# Patient Record
Sex: FEMALE | Race: WHITE | NOT HISPANIC OR LATINO | Employment: FULL TIME | URBAN - METROPOLITAN AREA
[De-identification: names, ages, dates, MRNs, and addresses within clinical notes are randomized per-mention and may not be internally consistent; named-entity substitution may affect disease eponyms.]

---
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
## 2019-12-26 NOTE — TELEPHONE ENCOUNTER
RECORDS RECEIVED FROM: Self   Date of Appt: 2/10/20   NOTES (FOR ALL VISITS) STATUS DETAILS   OFFICE NOTE from referring provider N/A    OFFICE NOTE from other specialist Care Everywhere Selin Macile @ [facility] Derm:  10/31/19    Karissa Stark NP @ [facility] (PCP):  10/15/19    Dr. Dillon @ [facility] Cardiology:  7/9/19   DISCHARGE SUMMARY from hospital N/A    DISCHARGE REPORT from the ER N/A    OPERATIVE REPORT N/A    MEDICATION LIST Care Everywhere    IMAGING  (FOR ALL VISITS)     EMG N/A    EEG N/A    MRI (HEAD, NECK, SPINE) N/A    LUMBAR PUNCTURE N/A    PEARL Scan N/A    CT (HEAD, NECK, SPINE) N/A

## 2020-02-10 ENCOUNTER — PRE VISIT (OUTPATIENT)
Dept: NEUROLOGY | Facility: CLINIC | Age: 20
End: 2020-02-10

## 2020-03-08 NOTE — PROGRESS NOTES
[facility] Neurology Consultation    Cesia Johnson MRN# [id number]   Age: 19 year old YOB: 2000     Requesting physician: Selin Young, Guy Alvarado     Reason for Consultation: paresthesias of hands and feet    History of Presenting Symptoms:  Cesia Johnson is a 19 year old female who presents today for evaluation of paresthesias of hands and feet.    The patient was seen by her PCP on 1/21/2020 for ongoing concerns of paresthesias in hands and feet, tachycardia, weight gain, myalgias, chronic fatigue.  She is followed by psychiatry for anxiety, depression, ADHD, and her self cutting and is on gabapentin for her psychiatric symptoms.  She had a lyme's screen done during this visit which returned negative.        Seen by cardiology on 1/17/2020 for several episodes of light-headedness with near syncope and profuse sweating.  Echocardiogram was reviewed, and impressions were that of palpitations and tachycardia, congential heart disease with pulmonary regurgitation, and presyncope thought to be related to autonomic dysfunction or heat intolerance in the setting of a beta blocker.    Today, the patient indicates that her primary concerns are of not sweating for a year.  She indicates this started a year ago about the same time as her heat rate changes. She noted that she had a rapid weight gain (160 --> 244lbs ) in the summer during about the same time she started noticing her anhydrosis of the entire body.  She still reports sweating in her underarms and where skin touches itself, but otherwise doesn't note any sweating in other areas of the body.   She had great difficulty in the summer staying cool and not feeling like she was having heat stroke.  She does have dry mouth and decreased salivary production but no mention of dry eyes, or vision difficulties.  No major rashes, except for redness that is not itchy on her left ankle.    She does mention pain in her feet and  tingling in her toes that is present at all times, but she thinks this is due to plantar fascitis. She has shooting pain going down her back into her legs, which occurred at about the same time as her weight gain.  Muscle relaxants have helped with this.  She has had multiple episodes of ring-worm in the past.      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  Living in [place]. No smoking (quit in 06/2019). She was using Marijuana until 8/2019.  She has used cocaine, pills, acid in the past (stopped with these 01/2019).    Tobacco Use     Smoking status: Current Every Day Smoker     Smokeless tobacco: Never Used   Substance Use Topics     Alcohol use: Yes     Comment: Occasionally     Drug use: Yes     Types: Marijuana     Comment: daily      Family History:   Paternal grandmother - alcoholism  Paternal aunt - heart disease  Father - psychiatric illness  Maternal grandfather - psychiatric illness     Medications:   Gabapentin 300 mg BID  Hydroxyzine 25 mg Q6H PRN (for itching)  Metoprolol 25 mg at bedtime  Trazodone 50 mg QHS     Allergies:   No Known Allergies     Review of Systems:   A comprehensive 10 point review of systems (constitutional, ENT, cardiac, peripheral vascular, lymphatic, respiratory, GI, , Musculoskeletal, skin, Neurological) was performed and found to be negative except as described in this note.      Physical Exam:   Vitals: /75 (BP Location: Left arm, Patient Position: Chair, Cuff Size: Adult Large)   Pulse 86   Resp 16   Wt 111.1 kg (244 lb 14.4 oz)   SpO2 97%   General: Seated comfortably in no acute distress.  HEENT: Neck supple with normal range of motion. No  paracervical muscle tenderness or tightness.  Optic discs sharp and vasculature normal on funduscopic exam.   Heart: Regular rate  Lungs: breathing comfortably  GI: Non tender  Extremities: no edema  Skin: No rashes  Neurologic:     Mental Status: Fully alert, attentive and oriented. Speech clear and fluent, no paraphasic errors.      Cranial Nerves: Visual fields intact. PERRL. EOMI with normal smooth pursuit. Facial sensation intact/symmetric. Facial movements symmetric. Hearing not formally tested but intact to conversation. Palate elevation symmetric, uvula midline. No dysarthria. Shoulder shrug strong bilaterally. Tongue protrusion midline.     Motor: No tremors or other abnormal movements observed. Muscle tone normal throughout. No pronator drift. Normal/symmetric rapid finger tapping. Strength 5/5 throughout upper and lower extremities.     Deep Tendon Reflexes: 2+/symmetric throughout upper and lower extremities. No clonus. Toes downgoing bilaterally.     Sensory: Intact/symmetric to light touch, pinprick, temperature, vibration and proprioception throughout upper and lower extremities. Negative Romberg.      Coordination: Finger-nose-finger and heel-shin intact without dysmetria. Rapid alternating movements intact/symmetric with normal speed and rhythm.     Gait: Normal, steady casual gait. Able to walk on toes, heels and tandem without difficulty.         Data: Pertinent prior to visit   Laboratory:  12/26/2019:  TSH 1.12, A1c 4.9, Lyme negative, HIV negative         Assessment and Plan:   Assessment:  - anhydrosis with heat intolerance  - paresthesias of feet with radicular (lumbar) symptoms     The patient has a relatively normal exam except for abnormally dilated pupils that are still reactive to light and accomodation.  Her story of rapid weight loss, heat intolerance, and anhydrosis is concerning for an abnormal autonomic neuropathy or some insult to the hypothalamus or pituitary axis.  I don't feel  that she should have autonomic testing at this moment, especially since there are a large amount of autoimmune/systemic immune/vitamin/endocrine issues that can lead to her symptoms.  I think she would benefit from head imaging to look for a brainstem/hypothalamic/pituitary abnormality to explain her symptoms.  On top of her imaging and laboratory testing, she would benefit from an EMG to look for lumbar radiculopathy or sciatica due to muscle and weight changes leading to shooting pain in her back into her thighs b/l.  Overall, given her age and negative family history, a hereditary autonomic neuropathy is unlikely, but a paraneoplastic process leading to these symptoms is possible and should be ruled out on top of other anatomical and metabolic/endocrine/vitamin processes.    Plan:  - MRI brain w/wout contrast  - EMG for paresthesias and back pain that radiates into legs (b/l lower extremity)  - TSH, ESR, CRP, SSA, SSB,       Follow up in Neurology clinic in 8 weeks or earlier as needed should new concerns arise.    MURIEL Alan D.O.   of Neurology      Greater than 50% of the total time (70 min) in this patient encounter was spent on counseling and/or coordination of care. We reviewed diagnostic results, impressions, and discussed other possible tests if symptoms do not improve. We discussed the implications of the diagnosis, as well as risks and benefits of management options. We reviewed treatment instructions and our scheduled follow-up as specified in the discharge plan. We also discussed the importance of compliance with the chosen course of treatment. The patient is in agreement with this plan and has no further questions.

## 2020-03-09 ENCOUNTER — OFFICE VISIT (OUTPATIENT)
Dept: NEUROLOGY | Facility: CLINIC | Age: 20
End: 2020-03-09
Payer: COMMERCIAL

## 2020-03-09 VITALS
OXYGEN SATURATION: 97 % | DIASTOLIC BLOOD PRESSURE: 75 MMHG | RESPIRATION RATE: 16 BRPM | WEIGHT: 244.9 LBS | HEART RATE: 86 BPM | SYSTOLIC BLOOD PRESSURE: 119 MMHG

## 2020-03-09 DIAGNOSIS — L74.4 ANHYDROSIS: Primary | ICD-10-CM

## 2020-03-09 DIAGNOSIS — L74.4 ANHYDROSIS: ICD-10-CM

## 2020-03-09 LAB
CK SERPL-CCNC: 126 U/L (ref 30–225)
CORTIS SERPL-MCNC: 14.1 UG/DL (ref 4–22)
CRP SERPL-MCNC: 4.2 MG/L (ref 0–8)
ERYTHROCYTE [SEDIMENTATION RATE] IN BLOOD BY WESTERGREN METHOD: 9 MM/H (ref 0–20)
TSH SERPL DL<=0.005 MIU/L-ACNC: 1.06 MU/L (ref 0.4–4)
VIT B12 SERPL-MCNC: 339 PG/ML (ref 193–986)

## 2020-03-09 PROCEDURE — 86235 NUCLEAR ANTIGEN ANTIBODY: CPT | Performed by: PSYCHIATRY & NEUROLOGY

## 2020-03-09 PROCEDURE — 82533 TOTAL CORTISOL: CPT | Performed by: PSYCHIATRY & NEUROLOGY

## 2020-03-09 PROCEDURE — 86038 ANTINUCLEAR ANTIBODIES: CPT | Performed by: PSYCHIATRY & NEUROLOGY

## 2020-03-09 PROCEDURE — 82784 ASSAY IGA/IGD/IGG/IGM EACH: CPT | Performed by: PSYCHIATRY & NEUROLOGY

## 2020-03-09 PROCEDURE — 86334 IMMUNOFIX E-PHORESIS SERUM: CPT | Performed by: PSYCHIATRY & NEUROLOGY

## 2020-03-09 RX ORDER — LORAZEPAM 1 MG/1
TABLET ORAL
Qty: 1 TABLET | Refills: 0 | Status: SHIPPED | OUTPATIENT
Start: 2020-03-09

## 2020-03-09 RX ORDER — CYCLOBENZAPRINE HCL 10 MG
10 TABLET ORAL 2 TIMES DAILY PRN
COMMUNITY
Start: 2020-02-27

## 2020-03-09 RX ORDER — GABAPENTIN 300 MG/1
300 CAPSULE ORAL 3 TIMES DAILY
COMMUNITY
Start: 2020-03-06

## 2020-03-09 RX ORDER — DILTIAZEM HYDROCHLORIDE 180 MG/1
180 CAPSULE, EXTENDED RELEASE ORAL DAILY
COMMUNITY
Start: 2020-01-17

## 2020-03-09 ASSESSMENT — ENCOUNTER SYMPTOMS
LOSS OF CONSCIOUSNESS: 0
CHILLS: 0
MEMORY LOSS: 0
EXERCISE INTOLERANCE: 0
MUSCLE CRAMPS: 0
LIGHT-HEADEDNESS: 1
ARTHRALGIAS: 1
NUMBNESS: 0
WEAKNESS: 0
POLYDIPSIA: 1
SEIZURES: 0
NAIL CHANGES: 0
MUSCLE WEAKNESS: 1
INCREASED ENERGY: 1
WEIGHT LOSS: 0
DECREASED CONCENTRATION: 1
HEADACHES: 1
INSOMNIA: 1
DECREASED APPETITE: 0
TREMORS: 0
PANIC: 1
JOINT SWELLING: 0
SKIN CHANGES: 0
SPEECH CHANGE: 0
DIZZINESS: 0
LEG PAIN: 0
NIGHT SWEATS: 0
WEIGHT GAIN: 1
ALTERED TEMPERATURE REGULATION: 1
HALLUCINATIONS: 1
BACK PAIN: 1
PALPITATIONS: 1
POOR WOUND HEALING: 0
STIFFNESS: 1
DISTURBANCES IN COORDINATION: 0
TINGLING: 1
ORTHOPNEA: 0
PARALYSIS: 0
SLEEP DISTURBANCES DUE TO BREATHING: 0
MYALGIAS: 1
FEVER: 0
DEPRESSION: 1
HYPOTENSION: 0
POLYPHAGIA: 0
NECK PAIN: 1
SYNCOPE: 0
FATIGUE: 1
HYPERTENSION: 0
NERVOUS/ANXIOUS: 1

## 2020-03-09 ASSESSMENT — PATIENT HEALTH QUESTIONNAIRE - PHQ9
SUM OF ALL RESPONSES TO PHQ QUESTIONS 1-9: 13
SUM OF ALL RESPONSES TO PHQ QUESTIONS 1-9: 13
10. IF YOU CHECKED OFF ANY PROBLEMS, HOW DIFFICULT HAVE THESE PROBLEMS MADE IT FOR YOU TO DO YOUR WORK, TAKE CARE OF THINGS AT HOME, OR GET ALONG WITH OTHER PEOPLE: SOMEWHAT DIFFICULT

## 2020-03-09 ASSESSMENT — PAIN SCALES - GENERAL: PAINLEVEL: MILD PAIN (2)

## 2020-03-09 NOTE — LETTER
3/9/2020       RE: Cesia Johnson  [address] [facility] [address]     Dear Colleague,    Thank you for referring your patient, Cesia Johnson, to the [facility] NEUROLOGY at [facility]. Please see a copy of my visit note below.    [facility] Neurology Consultation    Cesia Johnson MRN# [id number]   Age: 19 year old YOB: 2000     Requesting physician: Selin Maciel  [facility]     Reason for Consultation: paresthesias of hands and feet    History of Presenting Symptoms:  Cesia Johnson is a 19 year old female who presents today for evaluation of paresthesias of hands and feet.    The patient was seen by her PCP on 1/21/2020 for ongoing concerns of paresthesias in hands and feet, tachycardia, weight gain, myalgias, chronic fatigue.  She is followed by psychiatry for anxiety, depression, ADHD, and her self cutting and is on gabapentin for her psychiatric symptoms.  She had a lyme's screen done during this visit which returned negative.        Seen by cardiology on 1/17/2020 for several episodes of light-headedness with near syncope and profuse sweating.  Echocardiogram was reviewed, and impressions were that of palpitations and tachycardia, congential heart disease with pulmonary regurgitation, and presyncope thought to be related to autonomic dysfunction or heat intolerance in the setting of a beta blocker.    Today, the patient indicates that her primary concerns are of not sweating for a year.  She indicates this started a year ago about the same time as her heat rate changes. She noted that she had a rapid weight gain (160 --> 244lbs ) in the summer during about the same time she started noticing her anhydrosis of the entire body.  She still reports sweating in her underarms and where skin touches itself, but otherwise doesn't note any sweating in other areas of the body.   She had great difficulty in the summer staying cool and  not feeling like she was having heat stroke.  She does have dry mouth and decreased salivary production but no mention of dry eyes, or vision difficulties.  No major rashes, except for redness that is not itchy on her left ankle.    She does mention pain in her feet and tingling in her toes that is present at all times, but she thinks this is due to plantar fascitis. She has shooting pain going down her back into her legs, which occurred at about the same time as her weight gain.  Muscle relaxants have helped with this.  She has had multiple episodes of ring-worm in the past.      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  Living in [place]. No smoking (quit in 06/2019). She was using Marijuana until 8/2019.  She has used cocaine, pills, acid in the past (stopped with these 01/2019).    Tobacco Use     Smoking status: Current Every Day Smoker     Smokeless tobacco: Never Used   Substance Use Topics     Alcohol use: Yes     Comment: Occasionally     Drug use: Yes     Types: Marijuana     Comment: daily      Family History:   Paternal grandmother - alcoholism  Paternal aunt - heart disease  Father - psychiatric illness  Maternal grandfather - psychiatric illness     Medications:   Gabapentin 300 mg BID  Hydroxyzine 25 mg Q6H PRN (for itching)  Metoprolol 25 mg at bedtime  Trazodone 50 mg QHS     Allergies:   No Known Allergies     Review of Systems:   A comprehensive 10 point review of systems (constitutional, ENT, cardiac, peripheral vascular, lymphatic, respiratory, GI, , Musculoskeletal, skin, Neurological) was performed and found to be negative except as described in this note.      Physical  Exam:   Vitals: /75 (BP Location: Left arm, Patient Position: Chair, Cuff Size: Adult Large)   Pulse 86   Resp 16   Wt 111.1 kg (244 lb 14.4 oz)   SpO2 97%   General: Seated comfortably in no acute distress.  HEENT: Neck supple with normal range of motion. No paracervical muscle tenderness or tightness.  Optic discs sharp and vasculature normal on funduscopic exam.   Heart: Regular rate  Lungs: breathing comfortably  GI: Non tender  Extremities: no edema  Skin: No rashes  Neurologic:     Mental Status: Fully alert, attentive and oriented. Speech clear and fluent, no paraphasic errors.      Cranial Nerves: Visual fields intact. PERRL. EOMI with normal smooth pursuit. Facial sensation intact/symmetric. Facial movements symmetric. Hearing not formally tested but intact to conversation. Palate elevation symmetric, uvula midline. No dysarthria. Shoulder shrug strong bilaterally. Tongue protrusion midline.     Motor: No tremors or other abnormal movements observed. Muscle tone normal throughout. No pronator drift. Normal/symmetric rapid finger tapping. Strength 5/5 throughout upper and lower extremities.     Deep Tendon Reflexes: 2+/symmetric throughout upper and lower extremities. No clonus. Toes downgoing bilaterally.     Sensory: Intact/symmetric to light touch, pinprick, temperature, vibration and proprioception throughout upper and lower extremities. Negative Romberg.      Coordination: Finger-nose-finger and heel-shin intact without dysmetria. Rapid alternating movements intact/symmetric with normal speed and rhythm.     Gait: Normal, steady casual gait. Able to walk on toes, heels and tandem without difficulty.         Data: Pertinent prior to visit   Laboratory:  12/26/2019:  TSH 1.12, A1c 4.9, Lyme negative, HIV negative         Assessment and Plan:   Assessment:  - anhydrosis with heat intolerance  - paresthesias of feet with radicular (lumbar) symptoms     The patient has a relatively normal exam  except for abnormally dilated pupils that are still reactive to light and accomodation.  Her story of rapid weight loss, heat intolerance, and anhydrosis is concerning for an abnormal autonomic neuropathy or some insult to the hypothalamus or pituitary axis.  I don't feel that she should have autonomic testing at this moment, especially since there are a large amount of autoimmune/systemic immune/vitamin/endocrine issues that can lead to her symptoms.  I think she would benefit from head imaging to look for a brainstem/hypothalamic/pituitary abnormality to explain her symptoms.  On top of her imaging and laboratory testing, she would benefit from an EMG to look for lumbar radiculopathy or sciatica due to muscle and weight changes leading to shooting pain in her back into her thighs b/l.  Overall, given her age and negative family history, a hereditary autonomic neuropathy is unlikely, but a paraneoplastic process leading to these symptoms is possible and should be ruled out on top of other anatomical and metabolic/endocrine/vitamin processes.    Plan:  - MRI brain w/wout contrast  - EMG for paresthesias and back pain that radiates into legs (b/l lower extremity)  - TSH, ESR, CRP, SSA, SSB,       Follow up in Neurology clinic in 8 weeks or earlier as needed should new concerns arise.    MURIEL Alan D.O.   of Neurology    Greater than 50% of the total time (70 min) in this patient encounter was spent on counseling and/or coordination of care. We reviewed diagnostic results, impressions, and discussed other possible tests if symptoms do not improve. We discussed the implications of the diagnosis, as well as risks and benefits of management options. We reviewed treatment instructions and our scheduled follow-up as specified in the discharge plan. We also discussed the importance of compliance with the chosen course of treatment. The patient is in agreement with this plan and has no further  "questions.      [facility]:  PHQ-9 Screening Note    SITUATION/BACKGROUND                                                    Cesia Johnson is a 19 year old female who completed the PHQ-9 assessment for depression and Score is >9.    Onset of symptoms: improving  Trigger: stress, dad, family problems, arguments  Recent related events: no  Prior history of suicide attempt or self harm: No   Risk Factors: age, anxiety and previous suicide attempts  History of depression or mental illness: Yes  Medications reviewed: Yes     ASSESSMENT      A. Are any of the following present?      Suicidal thoughts with a plan and means to carry out the plan?    Intent to harm others    Altered mental status: confusion, delusional, psychotic     NO - go to B   B. Are any of the following present?      Suicidal thoughts without a plan or means to carry out the plan    New onset of delusional ideas    Past inpatient admission for depression    New onset and recent change or addition of new medication     NO - go to C   C. Are any of the following present?      Previous suicide attempts    Depression interfering with ability to work or function    Loss of appetite and eating poorly    Abrupt cessation of drugs (OTC or RX), alcohol or caffeine    Drug or alcohol abuse YES -  [facility] behavior health team. If no response, patient should receive crisis care within 24 hours.     Place referral to behavioral health team for \"regular\" follow-up.        D. Are several of the following present?      Difficulty concentrating    Difficulty sleeping    Reduced interest in sexual activity or impotency    Irregular or absent menstruation    No interest in activity    Change in interpersonal relationships    Increased use/abuse of alcohol or drugs    Pregnant or recent child birth    Recent major life change    History of depression YES -  Follow-up with PCP for appointment and follow home care instructions.    Place referral to " "behavioral health team for \"regular\" follow-up.            PLAN      Home Care Instructions:   If currently in counseling, call counselor for appointment  Call local crisis interventions  Contact friends or family for support  Increase exercise and enjoyable activities  East a well-balanced diet, drink plenty of fluids and rest as needed  Alcohol and other recreational drugs can worsen depression.  If heavy use of drugs or alcohol, contact counselor or PCP to help reduce consumption.    Report the following to your PCP:   Suicidal thoughts without a plan or means to carry out the plan  Depression interferes with daily activities  Persistent inability to sleep    Seek emergency care immediately if any of the following occur:   Suicidal thoughts and plan and means to carry out the plan  Injury to self or others  Altered mental status:  Confusion, Delusional, Pyschotic    BEHAVIORAL HEALTH TEAMS      [facility] - Behavioral Health Team    [facility] Pager: [phone]    [place]  - Behavioral Health Team    Pager number: [phone]    Referral to Behavioral Health    BEHAVIORAL / SPIRITUAL HEALTH SOWQ [61162}    RESOURCES      - 24/7 Crisis Hotlines: National Suicide Prevention Hotline  310-229-EZNA (7179)  - Walk-In Counseling Center:  [phone]  [address] [place] [address]  Hours:  M, W, F:  1:00-3:00PM      M-Th:  6:30-8:30PM    Ilda Waterman RN    Copyright 2016 [facility]      "

## 2020-03-09 NOTE — NURSING NOTE
Chief Complaint   Patient presents with     New Patient     [facility] STOPPED SWEATING THIS YEAR AND GAINED 80 LBS HEART PROBLEMS TOLD TO SEE NEURO    Coni Le CMA         Depression Response    Patient completed the PHQ-9 assessment for depression and scored >9? Yes  Question 9 on the PHQ-9 was positive for suicidality? No    I personally notified the following: clinic nurse         PHQ-9 score:    PHQ 3/9/2020   PHQ-9 Total Score 13   Q9: Thoughts of better off dead/self-harm past 2 weeks Not at all           Coni Le CMA

## 2020-03-09 NOTE — PATIENT INSTRUCTIONS
We will look into your anhydrosis, tachycardia, and weight gain with laboratory tests, imaging, and EMG  Labs:  TSH, ESR, CRP, SSA, SSB, PEPE, CK, ELP, Vitamin (E,D,B1, B12, B6), paraneoplastic, cortisol, metanephrines    Procedure to look at anhydrosis and paresthesias (feet and back issues)  - EMG  - MRI brain w/wout contrast      PLAN      Home Care Instructions:   If currently in counseling, call counselor for appointment  Call local crisis interventions  Contact friends or family for support  Increase exercise and enjoyable activities  East a well-balanced diet, drink plenty of fluids and rest as needed  Alcohol and other recreational drugs can worsen depression.  If heavy use of drugs or alcohol, contact counselor or PCP to help reduce consumption.    Report the following to your PCP:   Suicidal thoughts without a plan or means to carry out the plan  Depression interferes with daily activities  Persistent inability to sleep    Seek emergency care immediately if any of the following occur:   Suicidal thoughts and plan and means to carry out the plan  Injury to self or others  Altered mental status:  Confusion, Delusional, Pyschotic    BEHAVIORAL HEALTH TEAMS      [facility] [place] - Behavioral Health Team    [facility] Pager: [phone]    [place]  - Behavioral Health Team    Pager number: [phone]    Referral to Behavioral Health    BEHAVIORAL / SPIRITUAL HEALTH SOWQ [22072}    RESOURCES      - 24/7 Crisis Hotlines: National Suicide Prevention Hotline  113-740-UGVZ (0790)  - Walk-In Counseling Center:  [phone]  [address] [place] [address]  Hours:  M, W, F:  1:00-3:00PM      M-Th:  6:30-8:30PM

## 2020-03-09 NOTE — PROGRESS NOTES
"[facility]:  PHQ-9 Screening Note    SITUATION/BACKGROUND                                                    Cesia Johnson is a 19 year old female who completed the PHQ-9 assessment for depression and Score is >9.    Onset of symptoms: improving  Trigger: stress, dad, family problems, arguments  Recent related events: no  Prior history of suicide attempt or self harm: No   Risk Factors: age, anxiety and previous suicide attempts  History of depression or mental illness: Yes  Medications reviewed: Yes     ASSESSMENT      A. Are any of the following present?      Suicidal thoughts with a plan and means to carry out the plan?    Intent to harm others    Altered mental status: confusion, delusional, psychotic     NO - go to B   B. Are any of the following present?      Suicidal thoughts without a plan or means to carry out the plan    New onset of delusional ideas    Past inpatient admission for depression    New onset and recent change or addition of new medication     NO - go to C   C. Are any of the following present?      Previous suicide attempts    Depression interfering with ability to work or function    Loss of appetite and eating poorly    Abrupt cessation of drugs (OTC or RX), alcohol or caffeine    Drug or alcohol abuse YES -  [facility] behavior health team. If no response, patient should receive crisis care within 24 hours.     Place referral to behavioral health team for \"regular\" follow-up.        D. Are several of the following present?      Difficulty concentrating    Difficulty sleeping    Reduced interest in sexual activity or impotency    Irregular or absent menstruation    No interest in activity    Change in interpersonal relationships    Increased use/abuse of alcohol or drugs    Pregnant or recent child birth    Recent major life change    History of depression YES -  Follow-up with PCP for appointment and follow home care instructions.    Place referral to behavioral health " "team for \"regular\" follow-up.            PLAN      Home Care Instructions:   If currently in counseling, call counselor for appointment  Call local crisis interventions  Contact friends or family for support  Increase exercise and enjoyable activities  East a well-balanced diet, drink plenty of fluids and rest as needed  Alcohol and other recreational drugs can worsen depression.  If heavy use of drugs or alcohol, contact counselor or PCP to help reduce consumption.    Report the following to your PCP:   Suicidal thoughts without a plan or means to carry out the plan  Depression interferes with daily activities  Persistent inability to sleep    Seek emergency care immediately if any of the following occur:   Suicidal thoughts and plan and means to carry out the plan  Injury to self or others  Altered mental status:  Confusion, Delusional, Pyschotic    BEHAVIORAL HEALTH TEAMS      [facility] [place] - Behavioral Health Team    [facility] Pager: [phone]    [place]  - Behavioral Health Team    Pager number: [phone]    Referral to Behavioral Health    BEHAVIORAL / SPIRITUAL HEALTH SOWQ [39064}    RESOURCES      - 24/7 Crisis Hotlines: National Suicide Prevention Hotline  829-306-OUYW (4071)  - Walk-In Counseling Center:  [phone]  [address] [place] [address]  Hours:  M, W, F:  1:00-3:00PM      M-Th:  6:30-8:30PM    Ilda Waterman RN        Copyright 2016 [employer]      Answers for HPI/ROS submitted by the patient on 3/9/2020   If you checked off any problems, how difficult have these problems made it for you to do your work, take care of things at home, or get along with other people?: Somewhat difficult  PHQ9 TOTAL SCORE: 13  General Symptoms: Yes  Skin Symptoms: Yes  HENT Symptoms: No  EYE SYMPTOMS: No  HEART SYMPTOMS: Yes  LUNG SYMPTOMS: No  INTESTINAL SYMPTOMS: No  URINARY SYMPTOMS: No  GYNECOLOGIC SYMPTOMS: No  BREAST SYMPTOMS: No  SKELETAL SYMPTOMS: Yes  BLOOD SYMPTOMS: No  NERVOUS SYSTEM SYMPTOMS: " Yes  MENTAL HEALTH SYMPTOMS: Yes  Fever: No  Loss of appetite: No  Weight loss: No  Weight gain: Yes  Fatigue: Yes  Night sweats: No  Chills: No  Increased stress: No  Excessive hunger: No  Excessive thirst: Yes  Feeling hot or cold when others believe the temperature is normal: Yes  Loss of height: No  Post-operative complications: No  Surgical site pain: No  Hallucinations: Yes  Change in or Loss of Energy: Yes  Hyperactivity: Yes  Confusion: Yes  Changes in hair: No  Changes in moles/birth marks: No  Itching: No  Rashes: Yes  Changes in nails: No  Acne: No  Hair in places you don't want it: No  Change in facial hair: No  Warts: No  Non-healing sores: No  Chest pain or pressure: Yes  Fast or irregular heartbeat: Yes  Pain in legs with walking: No  Trouble breathing while lying down: No  Fingers or toes appear blue: No  High blood pressure: No  Low blood pressure: No  Fainting: No  Murmurs: Yes  Pacemaker: No  Varicose veins: No  Edema or swelling: No  Wake up at night with shortness of breath: No  Light-headedness: Yes  Exercise intolerance: No  Back pain: Yes  Muscle aches: Yes  Neck pain: Yes  Swollen joints: No  Joint pain: Yes  Bone pain: No  Muscle cramps: No  Muscle weakness: Yes  Joint stiffness: Yes  Bone fracture: No  Trouble with coordination: No  Dizziness or trouble with balance: No  Fainting or black-out spells: No  Memory loss: No  Headache: Yes  Seizures: No  Speech problems: No  Tingling: Yes  Tremor: No  Weakness: No  Difficulty walking: No  Paralysis: No  Numbness: No  Nervous or Anxious: Yes  Depression: Yes  Trouble sleeping: Yes  Trouble thinking or concentrating: Yes  Mood changes: Yes  Panic attacks: Yes

## 2020-03-10 LAB
ANA SER QL IF: NEGATIVE
ENA SS-A IGG SER IA-ACNC: <0.2 AI (ref 0–0.9)
ENA SS-B IGG SER IA-ACNC: <0.2 AI (ref 0–0.9)
IGA SERPL-MCNC: 218 MG/DL (ref 84–499)
IGG SERPL-MCNC: 1280 MG/DL (ref 610–1616)
IGM SERPL-MCNC: 90 MG/DL (ref 35–242)
PROT PATTERN SERPL IFE-IMP: NORMAL

## 2020-03-11 LAB
A-TOCOPHEROL VIT E SERPL-MCNC: 7.4 MG/L (ref 5.5–18)
BETA+GAMMA TOCOPHEROL SERPL-MCNC: 1.3 MG/L (ref 0–6)
VIT B6 SERPL-MCNC: 131.4 NMOL/L (ref 20–125)

## 2020-03-12 ENCOUNTER — HOSPITAL ENCOUNTER (OUTPATIENT)
Dept: MRI IMAGING | Facility: CLINIC | Age: 20
Discharge: HOME | End: 2020-03-12
Attending: PSYCHIATRY & NEUROLOGY | Admitting: PSYCHIATRY & NEUROLOGY
Payer: COMMERCIAL

## 2020-03-12 DIAGNOSIS — L74.4 ANHYDROSIS: ICD-10-CM

## 2020-03-12 LAB
ANNOTATION COMMENT IMP: NORMAL
METANEPHS SERPL-SCNC: 0.21 NMOL/L (ref 0–0.49)
NORMETANEPHRINE SERPL-SCNC: 0.65 NMOL/L (ref 0–0.89)
VIT B1 BLD-MCNC: 184 NMOL/L (ref 70–180)

## 2020-03-12 PROCEDURE — 25500064 ZZH RX 255 OP 636: Performed by: PSYCHIATRY & NEUROLOGY

## 2020-03-12 PROCEDURE — A9585 GADOBUTROL INJECTION: HCPCS | Performed by: PSYCHIATRY & NEUROLOGY

## 2020-03-12 PROCEDURE — 70553 MRI BRAIN STEM W/O & W/DYE: CPT

## 2020-03-12 RX ORDER — GADOBUTROL 604.72 MG/ML
15 INJECTION INTRAVENOUS ONCE
Status: COMPLETED | OUTPATIENT
Start: 2020-03-12 | End: 2020-03-12

## 2020-03-12 RX ADMIN — GADOBUTROL 11 ML: 604.72 INJECTION INTRAVENOUS at 10:44

## 2020-03-13 LAB — PNP ABY SERUM: NORMAL

## 2020-04-30 ENCOUNTER — TELEPHONE (OUTPATIENT)
Dept: NEUROLOGY | Facility: CLINIC | Age: 20
End: 2020-04-30

## 2020-04-30 NOTE — TELEPHONE ENCOUNTER
Due to the COVID-19 virus a risk benefit analysis was performed by the provider and in the best interest of the patient and the facility, the patient's EMG test was cancelled. LVM to call [phone] to reschedule.

## 2020-06-17 ENCOUNTER — OFFICE VISIT (OUTPATIENT)
Dept: NEUROLOGY | Facility: CLINIC | Age: 20
End: 2020-06-17
Payer: COMMERCIAL

## 2020-06-17 DIAGNOSIS — M54.41 CHRONIC BILATERAL LOW BACK PAIN WITH RIGHT-SIDED SCIATICA: Primary | ICD-10-CM

## 2020-06-17 DIAGNOSIS — G89.29 CHRONIC BILATERAL LOW BACK PAIN WITH RIGHT-SIDED SCIATICA: Primary | ICD-10-CM

## 2020-06-17 NOTE — PROGRESS NOTES
[facility]  Electrodiagnostic Laboratory    Nerve Conduction & EMG Report          Patient:       Cesia Johnson  Patient ID:    [id number]  Gender:        Female  YOB: 2000  Age:           19 Years 10 Months        History & Examination:  19 year old woman with low back pain radiating into right > left legs. Evaluate for radiculopathy.     Techniques: Motor and sensory conduction studies were done with surface recording electrodes. EMG was done with a concentric needle electrode.      Results:  Nerve conduction studies:   1. Bilateral sural and right superficial peroneal sensory responses are normal.   2. Bilateral peroneal-EDB and tibial-AH motor responses are normal.     Needle EMG of selected proximal and distal right and left lower limb muscles was performed as tabulated below. No abnormal spontaneous activity was observed in the sampled muscles. Motor unit potential morphology and recruitment patterns were normal.     Interpretation:  This is a normal study. There is no electrophysiologic evidence of a lumbosacral radiculopathy affecting the lower limbs on the basis of this study.    Hayes Graham MD  Department of Neurology        Sensory NCS      Nerve / Sites Rec. Site Onset Peak NP Amp Ref. PP Amp Dist Karan Ref. Temp     ms ms  V  V  V cm m/s m/s  C   R SURAL - Lat Mall      Calf Ankle 2.45 3.23 15.2 8.0 12.8 14 57.2 38.0 32.1   L SURAL - Lat Mall      Calf Ankle 2.40 3.07 15.5 8.0 10.5 14 58.4 38.0 31.9   R SUP PERONEAL      Lat Leg Coleman 2.29 2.86 12.9  13.3 12.5 54.5 38.0 32       Motor NCS      Nerve / Sites Rec. Site Lat Ref. Amp Ref. Rel Amp Dist Karan Ref. Dur. Area Temp.     ms ms mV mV % cm m/s m/s ms %  C   R DEEP PERONEAL - EDB      Ankle EDB 3.33 6.00 7.5 2.5 100 8   5.16 100 31      FibHead EDB 9.48  6.9  92.7 31.5 51.3 38.0 5.52 96.6 31.3      Pop Fos EDB 10.94  6.8  91.3 9 61.7 38.0 5.68 94.9 31.3   L DEEP PERONEAL - EDB      Ankle EDB 3.59 6.00 9.7 2.5 100 8    5.36 100 31.4   R TIBIAL - AH      Ankle AH 3.44 6.00 19.3 4.0 100 8   5.00 100 31.8      Pop Fos AH 11.30  16.2  83.7 39 49.6 38.0 5.99 88.6 31.7   L TIBIAL - AH      Ankle AH 3.85 6.00 14.7 4.0 100 8   4.95 100        EMG Summary Table     Spontaneous MUAP Recruitment    IA Fib PSW Fasc H.F. Amp Dur. PPP Pattern   R. VAST LATERALIS N None None None None N N N N   R. TIB ANTERIOR N None None None None N N N N   R. PERON LONGUS N None None None None N N N N   R. TIB POSTERIOR N None None None None N N N N   R. GASTROCN (MED) N None None None None N N N N   L. VAST LATERALIS N None None None None N N N N   L. TIB ANTERIOR N None None None None N N N N   L. GASTROCN (MED) N None None None None N N N N

## 2020-06-17 NOTE — LETTER
6/17/2020       RE: Cesia Johnson  [address] [facility] [address]     Dear Colleague,    Thank you for referring your patient, Cesia Johnson, to the [facility] EMG at [facility]. Please see a copy of my visit note below.        [facility]  Electrodiagnostic Laboratory    Nerve Conduction & EMG Report          Patient:       Cesia Johnson  Patient ID:    [id number]  Gender:        Female  YOB: 2000  Age:           19 Years 10 Months        History & Examination:  19 year old woman with low back pain radiating into right > left legs. Evaluate for radiculopathy.     Techniques: Motor and sensory conduction studies were done with surface recording electrodes. EMG was done with a concentric needle electrode.      Results:  Nerve conduction studies:   1. Bilateral sural and right superficial peroneal sensory responses are normal.   2. Bilateral peroneal-EDB and tibial-AH motor responses are normal.     Needle EMG of selected proximal and distal right and left lower limb muscles was performed as tabulated below. No abnormal spontaneous activity was observed in the sampled muscles. Motor unit potential morphology and recruitment patterns were normal.     Interpretation:  This is a normal study. There is no electrophysiologic evidence of a lumbosacral radiculopathy affecting the lower limbs on the basis of this study.    Hayes Graham MD  Department of Neurology        Sensory NCS      Nerve / Sites Rec. Site Onset Peak NP Amp Ref. PP Amp Dist Karan Ref. Temp     ms ms  V  V  V cm m/s m/s  C   R SURAL - Lat Mall      Calf Ankle 2.45 3.23 15.2 8.0 12.8 14 57.2 38.0 32.1   L SURAL - Lat Mall      Calf Ankle 2.40 3.07 15.5 8.0 10.5 14 58.4 38.0 31.9   R SUP PERONEAL      Lat Leg Coleman 2.29 2.86 12.9  13.3 12.5 54.5 38.0 32       Motor NCS      Nerve / Sites Rec. Site Lat Ref. Amp Ref. Rel Amp Dist Karan Ref. Dur. Area Temp.     ms ms mV mV % cm m/s m/s ms  %  C   R DEEP PERONEAL - EDB      Ankle EDB 3.33 6.00 7.5 2.5 100 8   5.16 100 31      FibHead EDB 9.48  6.9  92.7 31.5 51.3 38.0 5.52 96.6 31.3      Pop Fos EDB 10.94  6.8  91.3 9 61.7 38.0 5.68 94.9 31.3   L DEEP PERONEAL - EDB      Ankle EDB 3.59 6.00 9.7 2.5 100 8   5.36 100 31.4   R TIBIAL - AH      Ankle AH 3.44 6.00 19.3 4.0 100 8   5.00 100 31.8      Pop Fos AH 11.30  16.2  83.7 39 49.6 38.0 5.99 88.6 31.7   L TIBIAL - AH      Ankle AH 3.85 6.00 14.7 4.0 100 8   4.95 100        EMG Summary Table     Spontaneous MUAP Recruitment    IA Fib PSW Fasc H.F. Amp Dur. PPP Pattern   R. VAST LATERALIS N None None None None N N N N   R. TIB ANTERIOR N None None None None N N N N   R. PERON LONGUS N None None None None N N N N   R. TIB POSTERIOR N None None None None N N N N   R. GASTROCN (MED) N None None None None N N N N   L. VAST LATERALIS N None None None None N N N N   L. TIB ANTERIOR N None None None None N N N N   L. GASTROCN (MED) N None None None None N N N N                          Again, thank you for allowing me to participate in the care of your patient.      Sincerely,    Hayes Graham MD

## 2020-07-17 ENCOUNTER — VIRTUAL VISIT (OUTPATIENT)
Dept: NEUROLOGY | Facility: CLINIC | Age: 20
End: 2020-07-17
Payer: COMMERCIAL

## 2020-07-17 DIAGNOSIS — L74.4 ANHYDROSIS: Primary | ICD-10-CM

## 2020-07-17 RX ORDER — TRAZODONE HYDROCHLORIDE 50 MG/1
50 TABLET, FILM COATED ORAL AT BEDTIME
COMMUNITY

## 2020-07-17 RX ORDER — DULOXETIN HYDROCHLORIDE 60 MG/1
1 CAPSULE, DELAYED RELEASE ORAL DAILY
COMMUNITY
Start: 2020-06-22

## 2020-07-17 NOTE — PROGRESS NOTES
[facility] Neurology Follow Up Visit    Cesia Johnson MRN# [id number]   Age: 19 year old YOB: 2000     Brief history of symptoms: The patient was initially seen in neurologic consultation on 3/9/2020 for evaluation of paresthesias of hands and feet. Please see the comprehensive neurologic consultation note from that date in the Epic records for details.  Initial evaluation was for myalgias, chronic fatigue, tachycardia.  She has history of anxiety, depression, ADHD, and self cutting for which she is on gabapentin. The patient has episodes of profuse sweating and syncope for which she was evaluated by cardiology.  She was reporting not sweating for a year. She reported rapid weight gain. Exam was normal. Impression was for autonomic dysfunction along with possible polyneuropathy. EMG was normal as was laboratory testing (including paraneoplastic panel, TSH, CRP, SSA, SSB, SPEP, Cortisol, Metanephrines, PEPE, B1, B2, B6, Vitamin E, CK, CRP, ESR. MRI brain was not suggestive for any thalamic/hypothalamic lesions nor any other lesions.    Interval history: the patient reports that her sweating has become more patchy.  She indicates that during the warmer weather her full body anhydrosis is less present, and that she is sweating in her inner thigh, back of knee, and arm.  The patient doesn't report hyperhidrosis, bradycardia, facial flushing.  There is no report of unilateral facial flushing.      Past Medical History:     Past Medical History:   Diagnosis Date     ADHD (attention deficit hyperactivity disorder)      Anxiety      Depression       Medications:     Current Outpatient Medications   Medication Sig     cyclobenzaprine (FLEXERIL) 10 MG tablet 10 mg 2 times daily as needed      diltiazem ER (DILT-XR) 180 MG 24 hr capsule Take 180 mg by mouth daily      gabapentin (NEURONTIN) 300 MG capsule 3 times daily      LORazepam (ATIVAN) 1 MG tablet Take 40 minutes prior to imaging exam      Review of  Systems:   A comprehensive 10 point review of systems (constitutional, ENT, cardiac, peripheral vascular, lymphatic, respiratory, GI, , Musculoskeletal, skin, Neurological) was performed and found to be negative except as described in this note.     Pertinent Investigations since last visit:   EM2020  This is a normal study. There is no electrophysiologic evidence of a lumbosacral radiculopathy affecting the lower limbs on the basis of this study.         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 However, it would be beneficial to determine if any small fiber involvement is present, as this could lead to changes in further w/up or treatment pending normal/abnormal findings.     Plan:  Small fiber skin biopsy    Future:  Consider autonomic testing through Dr. Hamilton at [facility] or consider altering treatment for parethesias to include duloxetine/increased gabapentin      Follow up in Neurology clinic in 2-4 months or earlier as needed should new concerns arise.    MURIEL Alan D.O.   of Neurology      Greater than 50% of the total time (15 min) in this patient encounter was spent on counseling and/or coordination of care. We reviewed diagnostic results, impressions, and discussed other possible tests if symptoms do not improve. We discussed the implications of the diagnosis, as well as risks and benefits of management options. We reviewed treatment instructions and our scheduled follow-up as specified in the discharge plan. We also discussed the importance of  compliance with the chosen course of treatment. The patient is in agreement with this plan and has no further questions.

## 2020-07-17 NOTE — PROGRESS NOTES
"Cesia Johnson is a 19 year old female who is being evaluated via a billable telephone visit.      The patient has been notified of following:     \"This telephone visit will be conducted via a call between you and your physician/provider. We have found that certain health care needs can be provided without the need for a physical exam.  This service lets us provide the care you need with a short phone conversation.  If a prescription is necessary we can send it directly to your pharmacy.  If lab work is needed we can place an order for that and you can then stop by our lab to have the test done at a later time.    Telephone visits are billed at different rates depending on your insurance coverage. During this emergency period, for some insurers they may be billed the same as an in-person visit.  Please reach out to your insurance provider with any questions.    If during the course of the call the physician/provider feels a telephone visit is not appropriate, you will not be charged for this service.\"    Patient has given verbal consent for Telephone visit?  Yes    What phone number would you like to be contacted at? [phone]    How would you like to obtain your [facility]? [facility]    Phone call duration: 15 minutes    Moreno Alan, DO      "

## 2020-07-17 NOTE — LETTER
7/17/2020       RE: Cesia Johnson  [address] [facility] [address]     Dear Colleague,    Thank you for referring your patient, Cesia Johnson, to the [facility] NEUROLOGY at [facility]. Please see a copy of my visit note below.    [facility] Neurology Follow Up Visit    Cesia Johnson MRN# [id number]   Age: 19 year old YOB: 2000     Brief history of symptoms: The patient was initially seen in neurologic consultation on 3/9/2020 for evaluation of paresthesias of hands and feet. Please see the comprehensive neurologic consultation note from that date in the Epic records for details.  Initial evaluation was for myalgias, chronic fatigue, tachycardia.  She has history of anxiety, depression, ADHD, and self cutting for which she is on gabapentin. The patient has episodes of profuse sweating and syncope for which she was evaluated by cardiology.  She was reporting not sweating for a year. She reported rapid weight gain. Exam was normal. Impression was for autonomic dysfunction along with possible polyneuropathy. EMG was normal as was laboratory testing (including paraneoplastic panel, TSH, CRP, SSA, SSB, SPEP, Cortisol, Metanephrines, PEPE, B1, B2, B6, Vitamin E, CK, CRP, ESR. MRI brain was not suggestive for any thalamic/hypothalamic lesions nor any other lesions.    Interval history: the patient reports that her sweating has become more patchy.  She indicates that during the warmer weather her full body anhydrosis is less present, and that she is sweating in her inner thigh, back of knee, and arm.  The patient doesn't report hyperhidrosis, bradycardia, facial flushing.  There is no report of unilateral facial flushing.      Past Medical History:     Past Medical History:   Diagnosis Date     ADHD (attention deficit hyperactivity disorder)      Anxiety      Depression       Medications:     Current Outpatient Medications   Medication Sig     cyclobenzaprine  (FLEXERIL) 10 MG tablet 10 mg 2 times daily as needed      diltiazem ER (DILT-XR) 180 MG 24 hr capsule Take 180 mg by mouth daily      gabapentin (NEURONTIN) 300 MG capsule 3 times daily      LORazepam (ATIVAN) 1 MG tablet Take 40 minutes prior to imaging exam      Review of Systems:   A comprehensive 10 point review of systems (constitutional, ENT, cardiac, peripheral vascular, lymphatic, respiratory, GI, , Musculoskeletal, skin, Neurological) was performed and found to be negative except as described in this note.     Pertinent Investigations since last visit:   EM2020  This is a normal study. There is no electrophysiologic evidence of a lumbosacral radiculopathy affecting the lower limbs on the basis of this study.         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 However, it would be beneficial to determine if any small fiber involvement is present, as this could lead to changes in further w/up or treatment pending normal/abnormal findings.     Plan:  Small fiber skin biopsy    Future:  [place] [facility] [place] or consider altering treatment for parethesias to include duloxetine/increased gabapentin      Follow up in Neurology clinic in 2-4 months or earlier as needed should new concerns arise.    MURIEL Alan D.O.   of Neurology      Greater than 50% of the total time (15 min) in this patient encounter was spent on counseling and/or coordination of care. We reviewed diagnostic results, impressions, and discussed other possible  tests if symptoms do not improve. We discussed the implications of the diagnosis, as well as risks and benefits of management options. We reviewed treatment instructions and our scheduled follow-up as specified in the discharge plan. We also discussed the importance of compliance with the chosen course of treatment. The patient is in agreement with this plan and has no further questions.        Again, thank you for allowing me to participate in the care of your patient.      Sincerely,    Moreno Alan, DO

## 2020-11-14 ENCOUNTER — HEALTH MAINTENANCE LETTER (OUTPATIENT)
Age: 20
End: 2020-11-14

## 2021-09-12 ENCOUNTER — HEALTH MAINTENANCE LETTER (OUTPATIENT)
Age: 21
End: 2021-09-12

## 2021-09-16 ENCOUNTER — HOSPITAL ENCOUNTER (EMERGENCY)
Facility: CLINIC | Age: 21
Discharge: HOME | End: 2021-09-16
Attending: EMERGENCY MEDICINE | Admitting: EMERGENCY MEDICINE
Payer: COMMERCIAL

## 2021-09-16 ENCOUNTER — APPOINTMENT (OUTPATIENT)
Dept: GENERAL RADIOLOGY | Facility: CLINIC | Age: 21
End: 2021-09-16
Attending: EMERGENCY MEDICINE
Payer: COMMERCIAL

## 2021-09-16 VITALS
TEMPERATURE: 98.7 F | DIASTOLIC BLOOD PRESSURE: 64 MMHG | OXYGEN SATURATION: 97 % | HEART RATE: 73 BPM | WEIGHT: 230 LBS | SYSTOLIC BLOOD PRESSURE: 113 MMHG | RESPIRATION RATE: 20 BRPM

## 2021-09-16 DIAGNOSIS — R55 NEAR SYNCOPE: ICD-10-CM

## 2021-09-16 DIAGNOSIS — R00.2 PALPITATIONS: ICD-10-CM

## 2021-09-16 LAB
ANION GAP SERPL CALCULATED.3IONS-SCNC: 4 MMOL/L (ref 3–14)
ATRIAL RATE - MUSE: 92 BPM
B-HCG FREE SERPL-ACNC: <5 IU/L (ref 0–5)
BASOPHILS # BLD AUTO: 0.1 10E3/UL (ref 0–0.2)
BASOPHILS NFR BLD AUTO: 1 %
BUN SERPL-MCNC: 10 MG/DL (ref 7–30)
CALCIUM SERPL-MCNC: 8.4 MG/DL (ref 8.5–10.1)
CHLORIDE BLD-SCNC: 112 MMOL/L (ref 94–109)
CO2 SERPL-SCNC: 27 MMOL/L (ref 20–32)
CREAT SERPL-MCNC: 0.69 MG/DL (ref 0.52–1.04)
D DIMER PPP FEU-MCNC: 0.34 UG/ML FEU (ref 0–0.5)
DIASTOLIC BLOOD PRESSURE - MUSE: NORMAL MMHG
EOSINOPHIL # BLD AUTO: 0 10E3/UL (ref 0–0.7)
EOSINOPHIL NFR BLD AUTO: 0 %
ERYTHROCYTE [DISTWIDTH] IN BLOOD BY AUTOMATED COUNT: 13.2 % (ref 10–15)
GFR SERPL CREATININE-BSD FRML MDRD: >90 ML/MIN/1.73M2
GLUCOSE BLD-MCNC: 81 MG/DL (ref 70–99)
HCT VFR BLD AUTO: 38.3 % (ref 35–47)
HGB BLD-MCNC: 12.6 G/DL (ref 11.7–15.7)
IMM GRANULOCYTES # BLD: 0 10E3/UL
IMM GRANULOCYTES NFR BLD: 0 %
INTERPRETATION ECG - MUSE: NORMAL
LYMPHOCYTES # BLD AUTO: 2.8 10E3/UL (ref 0.8–5.3)
LYMPHOCYTES NFR BLD AUTO: 25 %
MCH RBC QN AUTO: 30.9 PG (ref 26.5–33)
MCHC RBC AUTO-ENTMCNC: 32.9 G/DL (ref 31.5–36.5)
MCV RBC AUTO: 94 FL (ref 78–100)
MONOCYTES # BLD AUTO: 0.9 10E3/UL (ref 0–1.3)
MONOCYTES NFR BLD AUTO: 8 %
NEUTROPHILS # BLD AUTO: 7.6 10E3/UL (ref 1.6–8.3)
NEUTROPHILS NFR BLD AUTO: 66 %
NRBC # BLD AUTO: 0 10E3/UL
NRBC BLD AUTO-RTO: 0 /100
P AXIS - MUSE: 45 DEGREES
PLATELET # BLD AUTO: 307 10E3/UL (ref 150–450)
POTASSIUM BLD-SCNC: 3.4 MMOL/L (ref 3.4–5.3)
PR INTERVAL - MUSE: 152 MS
QRS DURATION - MUSE: 78 MS
QT - MUSE: 360 MS
QTC - MUSE: 445 MS
R AXIS - MUSE: 49 DEGREES
RBC # BLD AUTO: 4.08 10E6/UL (ref 3.8–5.2)
SODIUM SERPL-SCNC: 143 MMOL/L (ref 133–144)
SYSTOLIC BLOOD PRESSURE - MUSE: NORMAL MMHG
T AXIS - MUSE: 42 DEGREES
TROPONIN I SERPL-MCNC: <0.015 UG/L (ref 0–0.04)
VENTRICULAR RATE- MUSE: 92 BPM
WBC # BLD AUTO: 11.4 10E3/UL (ref 4–11)

## 2021-09-16 PROCEDURE — 85379 FIBRIN DEGRADATION QUANT: CPT | Performed by: EMERGENCY MEDICINE

## 2021-09-16 PROCEDURE — 93005 ELECTROCARDIOGRAM TRACING: CPT

## 2021-09-16 PROCEDURE — 85025 COMPLETE CBC W/AUTO DIFF WBC: CPT | Performed by: EMERGENCY MEDICINE

## 2021-09-16 PROCEDURE — 99285 EMERGENCY DEPT VISIT HI MDM: CPT | Mod: 25

## 2021-09-16 PROCEDURE — 80048 BASIC METABOLIC PNL TOTAL CA: CPT | Performed by: EMERGENCY MEDICINE

## 2021-09-16 PROCEDURE — 71046 X-RAY EXAM CHEST 2 VIEWS: CPT

## 2021-09-16 PROCEDURE — 84484 ASSAY OF TROPONIN QUANT: CPT | Performed by: EMERGENCY MEDICINE

## 2021-09-16 PROCEDURE — 36415 COLL VENOUS BLD VENIPUNCTURE: CPT | Performed by: EMERGENCY MEDICINE

## 2021-09-16 PROCEDURE — 84702 CHORIONIC GONADOTROPIN TEST: CPT

## 2021-09-16 ASSESSMENT — ENCOUNTER SYMPTOMS
ARTHRALGIAS: 1
DIZZINESS: 1
DIAPHORESIS: 1
PALPITATIONS: 1

## 2021-09-16 NOTE — ED PROVIDER NOTES
History     Chief Complaint:  Near Syncope     The history is provided by the patient.      Cesia Johnson is a 21 year old female with history of tobacco use, pulmonary artery stenosis who presents with a near syncopal episode. She reports that she was standing and having a meal at work when she began to feel like her blood pressure was dropping. At this time, her watch, which monitors her heart rate, was indicating that her heart rate was in the 50's, which is very low for her. She reports that within a few seconds, her heart rate increased to 138 and remained there for approximately 5 minutes. During this time, she also notes experiencing diaphoresis, dizziness, blurred vision. She did not lose consciousness. She reports that she was feeling at her baseline prior to this episode. Cesia states that since the episode, she has been experiencing left chest and shoulder pain. Her pain has no exacerbating or alleviating factors. She does note that she has experienced similar symptoms in the past, which she believes were brought on by stress. She mentions that she has been dealing with some recent stressors regarding her romantic partner. Cesia denies history of PE, DVT, atrial fibrillation, atrial flutter, SVT, and denies recent immobilization. She does endorse a history of heart murmur. She denies hemoptysis. She denies taking oral birth control or other estrogen but notes that she has an IUD.    Review of Systems   Constitutional: Positive for diaphoresis.   Eyes: Positive for visual disturbance.   Respiratory:        Hemoptysis -   Cardiovascular: Positive for chest pain and palpitations.   Musculoskeletal: Positive for arthralgias.   Neurological: Positive for dizziness.   All other systems reviewed and are negative.    Allergies:  The patient has no known allergies.     Medications:  Diltiazem  Flonase  Zyrtec  Gabapentin  Flexeril  Trazodone  Hydroxyzine  Cymbalta    Past Medical History:     ADHD  Anxiety  Depression  Cannabis use  Tobacco use  Self harm  Pulmonary artery stenosis  Suicidal ideation    Past Surgical History:    Tonsillectomy  Adenoidectomy  [place] teeth extraction     Family History:    Father: borderline personality disorder, bipolar disorder, OCD, schizoid affective disorder  Brother: allergies    Social History:  Patient presents to the ED alone.  Patient presents to the ED via car.    Physical Exam     Patient Vitals for the past 24 hrs:   BP Temp Temp src Pulse Resp SpO2 Weight   09/16/21 0445 113/64 -- -- 73 20 97 % --   09/16/21 0430 120/68 -- -- 84 24 98 % --   09/16/21 0400 114/71 -- -- 79 22 98 % --   09/16/21 0345 102/65 -- -- 76 20 98 % --   09/16/21 0330 99/63 -- -- 81 20 99 % --   09/16/21 0315 115/61 -- -- -- -- -- --   09/16/21 0236 (!) 134/91 98.7  F (37.1  C) Temporal 108 16 98 % 104.3 kg (230 lb)       Physical Exam      Eyes:    Conjunctiva normal  Neck:    Supple, no meningismus.     CV:     Regular rate and rhythm.      No murmurs, rubs or gallops.       No unilateral leg swelling.       2+ radial pulses bilateral.       No lower extremity edema.  PULM:    Clear to auscultation bilateral.       No respiratory distress.      Good air exchange.     No rales or wheezing.     No stridor.  ABD:    Soft, non-tender, non-distended.       No pulsatile masses.       No rebound, guarding or rigidity.  MSK:     No gross deformity to all four extremities.   LYMPH:   No cervical lymphadenopathy.  NEURO:   Alert. Good muscle tone, no atrophy.  Skin:    Warm, dry and intact.    Psych:    Mood is good and affect is appropriate.        Emergency Department Course     ECG  ECG taken at 0244, ECG read at 0253  Normal sinus rhythm  Normal ECG  No significant change as compared to prior, dated 12/31/18.  Rate 92 bpm. AZ interval 152 ms. QRS duration 78 ms. QT/QTc 360/445 ms. P-R-T axes 45 49 42.     Imaging:    Chest XR,  PA & LAT  No evidence of active cardiopulmonary disease.        Reading per radiology    Laboratory:    CBC: WBC 11.4 (H), HGB 12.36,    BMP: chloride 112 (H), calcium 8.4 (L) o/w WNL (Creatinine 0.69)     Troponin (Collected 0315): <0.015    Ddimer: 0.34    iStat HCG quantitative: <5.0    Emergency Department Course:    Reviewed:  I reviewed nursing notes, vitals, past medical history, and care everywhere    Assessments:  0254 I obtained history and examined the patient as noted above.   0440 I rechecked the patient and explained findings.     Disposition:  The patient was discharged to home.     Impression & Plan     Medical Decision Making:    Cesia Johnson is a 21 year old female who presents to the emergency department for near syncopal episode followed by atypical left-sided chest pain.  EKG is without dysrhythmia or ischemic changes.  Troponin within normal limits.  No indication for serial enzymes.  Low suspicion for pulmonary embolism.  D-dimer within normal limits thus no indication for CT scan of the chest.  No features concerning for aortic dissection or aortic aneurysm.  The remainder of her ED evaluation was unremarkable.  Uncertain whether this represents vasovagal process vs transient dysrhythmia.  No risk factors for malignant dysrhythmia.  Patient safe for discharge home with close follow-up with primary care physician and return to the ED for any worsening symptoms.    Diagnosis:    ICD-10-CM    1. Palpitations  R00.2    2. Near syncope  R55        Scribe Disclosure:  JORGE Jonelle Parag, am serving as a scribe at 2:53 AM on 9/16/2021 to document services personally performed by Noah Clay MD based on my observations and the provider's statements to me.       Noah Clay MD  09/16/21 2015

## 2021-09-16 NOTE — Clinical Note
Cesia Johnson was seen and treated in our emergency department on 9/16/2021.  She may return to work on 09/17/2021.       If you have any questions or concerns, please don't hesitate to call.      Noah Clay MD

## 2021-09-16 NOTE — ED NOTES
Pt reports hx pulmonary stenosis and had an episode where she felt her heart rate drop and felt faint and sweaty and then noted her heart rate jumped to 130 for a few minutes.

## 2021-10-28 ENCOUNTER — APPOINTMENT (OUTPATIENT)
Dept: GENERAL RADIOLOGY | Facility: CLINIC | Age: 21
End: 2021-10-28
Attending: PHYSICIAN ASSISTANT
Payer: COMMERCIAL

## 2021-10-28 ENCOUNTER — HOSPITAL ENCOUNTER (EMERGENCY)
Facility: CLINIC | Age: 21
Discharge: HOME | End: 2021-10-28
Attending: PHYSICIAN ASSISTANT | Admitting: PHYSICIAN ASSISTANT
Payer: COMMERCIAL

## 2021-10-28 VITALS
TEMPERATURE: 98.1 F | DIASTOLIC BLOOD PRESSURE: 86 MMHG | WEIGHT: 240 LBS | SYSTOLIC BLOOD PRESSURE: 127 MMHG | RESPIRATION RATE: 18 BRPM | HEART RATE: 79 BPM | OXYGEN SATURATION: 98 %

## 2021-10-28 DIAGNOSIS — S46.911A RIGHT SHOULDER STRAIN, INITIAL ENCOUNTER: ICD-10-CM

## 2021-10-28 PROCEDURE — 99283 EMERGENCY DEPT VISIT LOW MDM: CPT

## 2021-10-28 PROCEDURE — 73030 X-RAY EXAM OF SHOULDER: CPT | Mod: RT

## 2021-10-28 RX ORDER — METHYLPREDNISOLONE 4 MG
TABLET, DOSE PACK ORAL
Qty: 21 TABLET | Refills: 0 | Status: SHIPPED | OUTPATIENT
Start: 2021-10-28

## 2021-10-28 ASSESSMENT — ENCOUNTER SYMPTOMS
ARTHRALGIAS: 1
FEVER: 0

## 2021-10-29 NOTE — ED PROVIDER NOTES
History     Chief Complaint:  Shoulder pain      The history is provided by the patient.      Cesia Johnson is a 21 year old female who presents with right shoulder pain. 3 days ago, the patient was doing repetitive box making at work and developed right shoulder pain. She denies any direct injury or trauma to the arm. She woke up the next day and her pain had worsened. Despite this, she went back to work that day. She rested all of yesterday, but is still having pain. Coughing and movement of the shoulder exacerbates the pain. She has tried ibuprofen, Tylenol, and muscle relaxers with no relief. She denies having a fever. No chest pain, sob, vomiting, or sweating.  No fever.  No numbness or weakness in hand or arm.    Review of Systems   Constitutional: Negative for fever.   Musculoskeletal: Positive for arthralgias (Right shoulder).   All other systems reviewed and are negative.      Allergies:  No Known Allergies    Medications:    Flexeril   Gabapentin   Ativan   Desyrel   Diltiazem   Zyrtec   Inderal     Past Medical History:    ADHD   Anxiety   Depression   Deliberate self cutting   Pulmonary artery stenosis   Suicidal ideation     Past Surgical History:    Tonsillectomy     Family History:    Father - bipolar, schizoaffective disorder, OCD    Social History:  Patient was accompanied to the ED with significant other.  Hx of marijuana use   Hx of alcohol use   Hx of tobacco use (former smoker)    Physical Exam     Patient Vitals for the past 24 hrs:   BP Temp Temp src Pulse Resp SpO2 Weight   10/28/21 2005 127/86 -- -- 79 -- -- --   10/28/21 2004 -- 98.1  F (36.7  C) Temporal 77 18 98 % 108.9 kg (240 lb)       Physical Exam  General: Well appearing.  Non-toxic    Head:  Atraumatic, external ears and nose normal.    Neck:  Normal range of motion without rigidity.    Negative Spurling test in neck    CV:  Regular rate and rhythm    No pathologic murmur, rubs, or gallops.    Resp:  Breath sounds are clear  bilaterally.  No crackles, wheezes, rhonchi.    Non-labored, no retractions or accessory muscle use.     MS:  Pain with active ROM of R shoulder.  Mild ttp over supraclavicular musculature.    No redness or effusion seen.  Able to perform passive external rotation of joint without significant pain.  No peripheral edema of upper extremities.  Compartments soft.  There is no tenderness or deformity of sternoclavicular joint, clavicle, AC joint.    Normal hook test of distal biceps tendon. No palpable deformity of tendon.    PROM of elbow without pain.      Neuro:  Normal strength and sensation at elbows, and radial, median, ulnar nerve distributions of hands Bl.    Skin:  2+ brachial and radial pulses.  Normal cap refill.  No rashes, fluctuance, or crepitance.    Psych: Alert, oriented.  Appropriate interactions.            Emergency Department Course   Imaging:  XR Shoulder Right G/E 3 Views  Final Result  IMPRESSION: Normal joint spaces and alignment. No fracture.     Per Radiology     Emergency Department Course:    Reviewed:  I reviewed nursing notes, vitals, past history and care everywhere    Assessments:   I obtained history and examined the patient as noted above. I discussed plans for discharge home.     Disposition:  The patient was discharged to home.    Impression & Plan    Medical Decision Makin year old female presents with R shoulder pain after repetative box making.  Patient history and records reviewed.  Broad differential was considered.  Patient is well appearing with stable vitals.  X-rays were obtained which demonstrates no evidence of fracture or dislocation.  Examination of joint above and below does not suggest referred pain and do not feel radiographs of these joints are indicated at this time.  Neurovascular status is intact distally and no signs of compartment syndrome.    I suspect muscular strain/tendinitis is most likely. There is no evidence to suggest cervical radiculopathy or  thoracic outlet syndrome as an etiology for symptoms.  The patient's pain is clearly mechanical in nature, reproducible with movement/palpation, and there is no associated chest pain or shortness of breath.  Suspicion for referred pain from cardiac or pulmonary cause is extremely low.  I doubt septic arthritis or inflammatory effusion such as gout or pseudogout given absence of effusion, joint redness, afebrile, normal active and passive ROM.  No clinical evidence of overlying skin or soft tissue infection such as cellulitis or necrotizing soft tissue infection.    Given significant pain with movement, the patient was given a sling.  Recommended conservative treatment with NSAIDS, ice, rest, stretching.  Follow-up with ortho within 1 week.  Discussed need to avoid prolonged immobilization to avoid frozen shoulder.  Return if new, worsening, or changing sxs.          Covid-19  Cesia Johnson was evaluated during a global COVID-19 pandemic, which necessitated consideration that the patient might be at risk for infection with the SARS-CoV-2 virus that causes COVID-19.   Applicable protocols for evaluation were followed during the patient's care.       Diagnosis:    ICD-10-CM    1. Right shoulder strain, initial encounter  S46.911A        Discharge Medications:  Discharge Medication List as of 10/28/2021  9:56 PM      START taking these medications    Details   diclofenac (VOLTAREN) 1 % topical gel Apply 2 g topically 4 times daily as needed for moderate pain, Disp-20 g, R-0, E-Prescribe      methylPREDNISolone (MEDROL DOSEPAK) 4 MG tablet therapy pack Follow Package Directions, Disp-21 tablet, R-0, E-Prescribe               Scribe Disclosure:  I, Parag Baldwin, am serving as a scribe at 8:42 PM on 10/28/2021 to document services personally performed by Aaron Estrada PA-C based on my observations and the provider's statements to me.      Aaron Estrada PA-C  10/29/21 0141

## 2022-01-02 ENCOUNTER — HEALTH MAINTENANCE LETTER (OUTPATIENT)
Age: 22
End: 2022-01-02

## 2022-05-25 ENCOUNTER — HOSPITAL ENCOUNTER (EMERGENCY)
Facility: CLINIC | Age: 22
Discharge: HOME | End: 2022-05-25
Attending: PHYSICIAN ASSISTANT | Admitting: PHYSICIAN ASSISTANT
Payer: COMMERCIAL

## 2022-05-25 VITALS
TEMPERATURE: 98.4 F | RESPIRATION RATE: 22 BRPM | OXYGEN SATURATION: 96 % | DIASTOLIC BLOOD PRESSURE: 72 MMHG | SYSTOLIC BLOOD PRESSURE: 117 MMHG | HEART RATE: 93 BPM

## 2022-05-25 DIAGNOSIS — U07.1 INFECTION DUE TO 2019 NOVEL CORONAVIRUS: ICD-10-CM

## 2022-05-25 LAB
DEPRECATED S PYO AG THROAT QL EIA: NEGATIVE
FLUAV RNA SPEC QL NAA+PROBE: NEGATIVE
FLUBV RNA RESP QL NAA+PROBE: NEGATIVE
GROUP A STREP BY PCR: NOT DETECTED
RSV RNA SPEC NAA+PROBE: NEGATIVE
SARS-COV-2 RNA RESP QL NAA+PROBE: POSITIVE

## 2022-05-25 PROCEDURE — 87651 STREP A DNA AMP PROBE: CPT | Performed by: PHYSICIAN ASSISTANT

## 2022-05-25 PROCEDURE — C9803 HOPD COVID-19 SPEC COLLECT: HCPCS

## 2022-05-25 PROCEDURE — 87637 SARSCOV2&INF A&B&RSV AMP PRB: CPT | Performed by: PHYSICIAN ASSISTANT

## 2022-05-25 PROCEDURE — 99283 EMERGENCY DEPT VISIT LOW MDM: CPT | Mod: CS

## 2022-05-25 ASSESSMENT — ENCOUNTER SYMPTOMS
HEADACHES: 1
FEVER: 1
SHORTNESS OF BREATH: 0
HEMATURIA: 0
MYALGIAS: 1
DYSURIA: 0
COUGH: 1

## 2022-05-25 NOTE — ED PROVIDER NOTES
History   Chief Complaint:  Fever       HPI   Cesia Johnson is a 21 year old female with history of ADHD, anxiety, depression who presents with fever, body aches, headache and cough for 2 days. She notes that she recently ran out of [facility]. No recent travel. Denies rash, chest pain, shortness of breath, hemoptysis, leg swelling, dysuria or hematuria.  She is not vaccinated.  No neck stiffness.    Review of Systems   Constitutional: Positive for fever.   Respiratory: Positive for cough. Negative for shortness of breath.    Cardiovascular: Negative for chest pain and leg swelling.   Genitourinary: Negative for dysuria and hematuria.   Musculoskeletal: Positive for myalgias.   Skin: Negative for rash.   Neurological: Positive for headaches.   All other systems reviewed and are negative.      Allergies:  The patient has no known allergies.     Medications:  Flexeril  Voltaren  Diltiazem  Cymbalta  Neurontin  Ativan  Medrol dosepak  Multivitamin  Desyrel   Abilify      Past Medical History:     ADHD  Anxiety  Depression    Past Surgical History:    Tonsillectomy  Adenoidectomy  [place] teeth extraction    Family History:    Brother - allergies  Father - psychiatric illness    Social History:  The patient presents to the ED alone.    Physical Exam     Patient Vitals for the past 24 hrs:   BP Temp Temp src Pulse Resp SpO2   05/25/22 1453 -- -- -- 93 22 96 %   05/25/22 1253 117/72 98.4  F (36.9  C) Oral 98 20 96 %       Physical Exam  General: Awake, alert, non-toxic.  Head:  Scalp is NC/AT  Eyes:  Conjunctiva normal, PERRL  ENT:  The external nose and ears are normal.     The oropharynx is normal and without erythema or tonsillar swelling. Uvula midline, no submandibular swelling, sublingual swelling, trismus.    Neck:  Normal range of motion without rigidity.  CV:  Regular rate and rhythm    No pathologic murmur, rubs, or gallops.  Resp:  Breath sounds are clear bilaterally    Non-labored, no retractions or  accessory muscle use  Abdomen: Abdomen is soft, no distension, no tenderness, no masses. No CVA tenderness.  MS:  No lower extremity edema or asymmetric calf swelling. No midline cervical, thoracic, or lumbar tenderness  Skin:  Warm and dry, No rash or lesions noted.  Neuro: Alert and oriented.  GCS 15 No gross motor deficits.  No facial asymmetry.  Psych:  Awake. Alert. Normal affect. Appropriate interactions.    Emergency Department Course     Laboratory:  Labs Ordered and Resulted from Time of ED Arrival to Time of ED Departure   INFLUENZA A/B & SARS-COV2 PCR MULTIPLEX - Abnormal       Result Value    Influenza A PCR Negative      Influenza B PCR Negative      RSV PCR Negative      SARS CoV2 PCR Positive (*)    STREPTOCOCCUS A RAPID SCREEN W REFELX TO PCR - Normal    Group A Strep antigen Negative     GROUP A STREPTOCOCCUS PCR THROAT SWAB        Emergency Department Course:  Reviewed:  I reviewed nursing notes, vitals, past medical history and Care Everywhere    Assessments:  1254 I obtained history and examined the patient as noted above.   1405 I rechecked the patient and explained findings.     Disposition:  The patient was discharged to home.     Impression & Plan     CMS Diagnoses: None    Medical Decision Makin-year-old female who presents with cough headache fever body aches.  On examination she is well-appearing and afebrile.  COVID test is positive and symptoms are consistent with this.  No evidence to suggest serious bacterial infection, meningitis, encephalitis.  No evidence of ACS, PE, myocarditis, CHF, multisystem organ failure or other complication of COVID-19 infection.  She does have risk factor of obesity and is unvaccinated and therefore discussed Paxlovid therapy, however the patient is not interested in this given that I discussed she would need to adjust the doses of her medications including the Abilify and she states in the past this has led to very bad effects for her.  Discussed  Molupirivir however elected against given low efficacy.  Discussed quarantine, return precautions for new or worsening symptoms shortness of breath chest pain hemoptysis leg swelling weakness neck stiffness O2 sats less than 92% etc.  Diagnosis:    ICD-10-CM    1. Infection due to 2019 novel coronavirus  U07.1          Scribe Disclosure:  I, Belkys Madison, am serving as a scribe at 12:59 PM on 5/25/2022 to document services personally performed by Aaron Estrada PA-C based on my observations and the provider's statements to me.            Aaron Estrada PA-C  05/25/22 6508

## 2022-05-25 NOTE — ED TRIAGE NOTES
Fever, cough, headache, body aches for 3 days. Highest temp 102. Tylenol at 1130. No COVID test yet. ABCs intact. A&OX4.      Triage Assessment     Row Name 05/25/22 1252       Triage Assessment (Adult)    Airway WDL WDL       Respiratory WDL    Respiratory WDL WDL       Skin Circulation/Temperature WDL    Skin Circulation/Temperature WDL WDL       Cardiac WDL    Cardiac WDL WDL       Peripheral/Neurovascular WDL    Peripheral Neurovascular WDL WDL       Cognitive/Neuro/Behavioral WDL    Cognitive/Neuro/Behavioral WDL WDL

## 2022-05-25 NOTE — DISCHARGE INSTRUCTIONS

## 2022-05-25 NOTE — ED NOTES
Patient given information on Loop Referral and pulse oximeter reading.  Denies questions or concerns at discharge. Will return to the ER if worsening symptoms.

## 2022-05-26 NOTE — RESULT ENCOUNTER NOTE
Group A Streptococcus PCR is NEGATIVE  No treatment or change in treatment [facility] ED lab result Strep Group A protocol.

## 2022-11-19 ENCOUNTER — HEALTH MAINTENANCE LETTER (OUTPATIENT)
Age: 22
End: 2022-11-19

## 2023-04-09 ENCOUNTER — HEALTH MAINTENANCE LETTER (OUTPATIENT)
Age: 23
End: 2023-04-09

## 2023-07-09 ENCOUNTER — HOSPITAL ENCOUNTER (EMERGENCY)
Facility: CLINIC | Age: 23
Discharge: HOME | End: 2023-07-10
Attending: EMERGENCY MEDICINE | Admitting: EMERGENCY MEDICINE
Payer: COMMERCIAL

## 2023-07-09 VITALS
SYSTOLIC BLOOD PRESSURE: 111 MMHG | OXYGEN SATURATION: 96 % | WEIGHT: 180 LBS | HEIGHT: 67 IN | BODY MASS INDEX: 28.25 KG/M2 | RESPIRATION RATE: 16 BRPM | HEART RATE: 66 BPM | TEMPERATURE: 97.4 F | DIASTOLIC BLOOD PRESSURE: 79 MMHG

## 2023-07-09 DIAGNOSIS — R51.9 NONINTRACTABLE HEADACHE, UNSPECIFIED CHRONICITY PATTERN, UNSPECIFIED HEADACHE TYPE: ICD-10-CM

## 2023-07-09 DIAGNOSIS — R00.2 PALPITATIONS: ICD-10-CM

## 2023-07-09 PROCEDURE — 84703 CHORIONIC GONADOTROPIN ASSAY: CPT | Performed by: EMERGENCY MEDICINE

## 2023-07-09 PROCEDURE — 84484 ASSAY OF TROPONIN QUANT: CPT | Performed by: EMERGENCY MEDICINE

## 2023-07-09 PROCEDURE — 93005 ELECTROCARDIOGRAM TRACING: CPT

## 2023-07-09 PROCEDURE — 85025 COMPLETE CBC W/AUTO DIFF WBC: CPT | Performed by: EMERGENCY MEDICINE

## 2023-07-09 PROCEDURE — 36415 COLL VENOUS BLD VENIPUNCTURE: CPT | Performed by: EMERGENCY MEDICINE

## 2023-07-09 PROCEDURE — 96374 THER/PROPH/DIAG INJ IV PUSH: CPT

## 2023-07-09 PROCEDURE — 99284 EMERGENCY DEPT VISIT MOD MDM: CPT | Mod: 25

## 2023-07-09 PROCEDURE — 84443 ASSAY THYROID STIM HORMONE: CPT | Performed by: EMERGENCY MEDICINE

## 2023-07-09 PROCEDURE — 82310 ASSAY OF CALCIUM: CPT | Performed by: EMERGENCY MEDICINE

## 2023-07-09 RX ORDER — LORAZEPAM 1 MG/1
1 TABLET ORAL ONCE
Status: COMPLETED | OUTPATIENT
Start: 2023-07-09 | End: 2023-07-10

## 2023-07-09 RX ORDER — LIDOCAINE HYDROCHLORIDE 40 MG/ML
1 INJECTION, SOLUTION RETROBULBAR ONCE
Status: COMPLETED | OUTPATIENT
Start: 2023-07-09 | End: 2023-07-10

## 2023-07-09 RX ORDER — KETOROLAC TROMETHAMINE 15 MG/ML
10 INJECTION, SOLUTION INTRAMUSCULAR; INTRAVENOUS ONCE
Status: COMPLETED | OUTPATIENT
Start: 2023-07-09 | End: 2023-07-10

## 2023-07-09 NOTE — LETTER
July 10, 2023      To Whom It May Concern:      Cesia Johnson was seen in our Emergency Department today, 07/09/23, into 7/10/23    Sincerely,        Vivi Gonzalez RN

## 2023-07-10 LAB
ANION GAP SERPL CALCULATED.3IONS-SCNC: 8 MMOL/L (ref 7–15)
ATRIAL RATE - MUSE: 60 BPM
BASOPHILS # BLD AUTO: 0.1 10E3/UL (ref 0–0.2)
BASOPHILS NFR BLD AUTO: 1 %
BUN SERPL-MCNC: 7.8 MG/DL (ref 6–20)
CALCIUM SERPL-MCNC: 9.2 MG/DL (ref 8.6–10)
CHLORIDE SERPL-SCNC: 104 MMOL/L (ref 98–107)
CREAT SERPL-MCNC: 0.7 MG/DL (ref 0.51–0.95)
DEPRECATED HCO3 PLAS-SCNC: 26 MMOL/L (ref 22–29)
DIASTOLIC BLOOD PRESSURE - MUSE: NORMAL MMHG
EOSINOPHIL # BLD AUTO: 0.1 10E3/UL (ref 0–0.7)
EOSINOPHIL NFR BLD AUTO: 1 %
ERYTHROCYTE [DISTWIDTH] IN BLOOD BY AUTOMATED COUNT: 12.7 % (ref 10–15)
GFR SERPL CREATININE-BSD FRML MDRD: >90 ML/MIN/1.73M2
GLUCOSE SERPL-MCNC: 100 MG/DL (ref 70–99)
HCG SERPL QL: NEGATIVE
HCT VFR BLD AUTO: 43 % (ref 35–47)
HGB BLD-MCNC: 14.4 G/DL (ref 11.7–15.7)
IMM GRANULOCYTES # BLD: 0 10E3/UL
IMM GRANULOCYTES NFR BLD: 0 %
INTERPRETATION ECG - MUSE: NORMAL
LYMPHOCYTES # BLD AUTO: 3.2 10E3/UL (ref 0.8–5.3)
LYMPHOCYTES NFR BLD AUTO: 44 %
MCH RBC QN AUTO: 31.5 PG (ref 26.5–33)
MCHC RBC AUTO-ENTMCNC: 33.5 G/DL (ref 31.5–36.5)
MCV RBC AUTO: 94 FL (ref 78–100)
MONOCYTES # BLD AUTO: 0.4 10E3/UL (ref 0–1.3)
MONOCYTES NFR BLD AUTO: 6 %
NEUTROPHILS # BLD AUTO: 3.5 10E3/UL (ref 1.6–8.3)
NEUTROPHILS NFR BLD AUTO: 48 %
NRBC # BLD AUTO: 0 10E3/UL
NRBC BLD AUTO-RTO: 0 /100
P AXIS - MUSE: 6 DEGREES
PLATELET # BLD AUTO: 279 10E3/UL (ref 150–450)
POTASSIUM SERPL-SCNC: 4.4 MMOL/L (ref 3.4–5.3)
PR INTERVAL - MUSE: 162 MS
QRS DURATION - MUSE: 78 MS
QT - MUSE: 404 MS
QTC - MUSE: 404 MS
R AXIS - MUSE: 60 DEGREES
RBC # BLD AUTO: 4.57 10E6/UL (ref 3.8–5.2)
SODIUM SERPL-SCNC: 138 MMOL/L (ref 136–145)
SYSTOLIC BLOOD PRESSURE - MUSE: NORMAL MMHG
T AXIS - MUSE: 44 DEGREES
TROPONIN T SERPL HS-MCNC: <6 NG/L
TSH SERPL DL<=0.005 MIU/L-ACNC: 1.35 UIU/ML (ref 0.3–4.2)
VENTRICULAR RATE- MUSE: 60 BPM
WBC # BLD AUTO: 7.2 10E3/UL (ref 4–11)

## 2023-07-10 PROCEDURE — 250N000011 HC RX IP 250 OP 636: Mod: JZ | Performed by: EMERGENCY MEDICINE

## 2023-07-10 PROCEDURE — 250N000009 HC RX 250: Performed by: EMERGENCY MEDICINE

## 2023-07-10 PROCEDURE — 250N000013 HC RX MED GY IP 250 OP 250 PS 637: Performed by: EMERGENCY MEDICINE

## 2023-07-10 RX ADMIN — KETOROLAC TROMETHAMINE 10 MG: 15 INJECTION, SOLUTION INTRAMUSCULAR; INTRAVENOUS at 00:05

## 2023-07-10 RX ADMIN — LORAZEPAM 1 MG: 1 TABLET ORAL at 00:04

## 2023-07-10 RX ADMIN — LIDOCAINE HYDROCHLORIDE 1 ML: 40 INJECTION, SOLUTION RETROBULBAR; TOPICAL at 00:05

## 2023-07-10 ASSESSMENT — ACTIVITIES OF DAILY LIVING (ADL): ADLS_ACUITY_SCORE: 35

## 2023-07-10 NOTE — ED PROVIDER NOTES
"  History     Chief Complaint:  Palpitations       HPI   Cesia Johnson is a 22 year old female complaining of palpitations.  She describes a headache similar to migraines that she had but longer intermittently over the past 3 days general pressure sensation throughout her head radiates back.  She been having intermittent's of \"hot flashes\" feeling lightheaded like she is in a pass out with heart palpitations feeling her heart beating.  Denies chest pain denies shortness of breath.  Headache is currently minimal at a 2-3 out of 10.  With no nausea vomiting and mild photophobia.            Medications:    cyclobenzaprine (FLEXERIL) 10 MG tablet  diclofenac (VOLTAREN) 1 % topical gel  diltiazem ER (DILT-XR) 180 MG 24 hr capsule  DULoxetine (CYMBALTA) 60 MG capsule  gabapentin (NEURONTIN) 300 MG capsule  LORazepam (ATIVAN) 1 MG tablet  methylPREDNISolone (MEDROL DOSEPAK) 4 MG tablet therapy pack  Multiple Vitamin (MULTIVITAMIN PO)  traZODone (DESYREL) 50 MG tablet        Past Medical History:    Past Medical History:   Diagnosis Date     ADHD (attention deficit hyperactivity disorder)      Anxiety      Depression        Past Surgical History:    Past Surgical History:   Procedure Laterality Date     TONSILLECTOMY          Physical Exam     Patient Vitals for the past 24 hrs:   BP Temp Pulse Resp SpO2 Height Weight   07/09/23 1953 111/79 -- -- -- -- -- --   07/09/23 1951 -- 97.4  F (36.3  C) 66 16 96 % 1.702 m (5' 7\") 81.6 kg (180 lb)        Physical Exam  Constitutional:  Oriented to person, place, and time.   HENT:   Head:    Normocephalic.   Mouth/Throat:   Oropharynx is clear and moist.   Eyes:    EOM are normal. Pupils are equal, round, and reactive to light.   Neck:    Neck supple.   Cardiovascular:  Normal rate, regular rhythm and normal heart sounds.      Exam reveals no gallop and no friction rub.       No murmur heard.  Pulmonary/Chest:  Effort normal and breath sounds normal.      No respiratory distress. " No wheezes. No rales.      No reproducible chest wall pain.  Abdominal:   Soft. No distension. No tenderness. No rebound and no guarding.   Musculoskeletal:  Normal range of motion.   Neurological:   Alert and oriented to person, place, and time.  Cranial nerves are intact, no meningeal signs.          Moves all 4 extremities spontaneously    Skin:    No rash noted. No pallor.       Emergency Department Course   ECG  ECG taken at 2009, ECG read at 2014  Normal sinus rhythm  Normal ECG   Rate 60 bpm. NJ interval 162 ms. QRS duration 78 ms. QT/QTc 404/404 ms. P-R-T axes 6 60 44.         Laboratory:  Labs Ordered and Resulted from Time of ED Arrival to Time of ED Departure   BASIC METABOLIC PANEL - Abnormal       Result Value    Sodium 138      Potassium 4.4      Chloride 104      Carbon Dioxide (CO2) 26      Anion Gap 8      Urea Nitrogen 7.8      Creatinine 0.70      Calcium 9.2      Glucose 100 (*)     GFR Estimate >90     TSH WITH FREE T4 REFLEX - Normal    TSH 1.35     HCG QUALITATIVE PREGNANCY - Normal    hCG Serum Qualitative Negative     TROPONIN T, HIGH SENSITIVITY - Normal    Troponin T, High Sensitivity <6     CBC WITH PLATELETS AND DIFFERENTIAL    WBC Count 7.2      RBC Count 4.57      Hemoglobin 14.4      Hematocrit 43.0      MCV 94      MCH 31.5      MCHC 33.5      RDW 12.7      Platelet Count 279      % Neutrophils 48      % Lymphocytes 44      % Monocytes 6      % Eosinophils 1      % Basophils 1      % Immature Granulocytes 0      NRBCs per 100 WBC 0      Absolute Neutrophils 3.5      Absolute Lymphocytes 3.2      Absolute Monocytes 0.4      Absolute Eosinophils 0.1      Absolute Basophils 0.1      Absolute Immature Granulocytes 0.0      Absolute NRBCs 0.0            Emergency Department Course & Assessments:             Interventions:  Medications   0.9% sodium chloride BOLUS (has no administration in time range)   lidocaine 4 % injection 1 mL (1 mL Nasal $Given 7/10/23 0005)   ketorolac (TORADOL)  injection 10 mg (10 mg Intravenous $Given 7/10/23 0005)   LORazepam (ATIVAN) tablet 1 mg (1 mg Oral $Given 7/10/23 0004)        Assessments:  Headaches improved after interventions.      Social Determinants of Health affecting care:   None    Disposition:  The patient was discharged to home.     Impression & Plan      Medical Decision Makin-year-old female that came in complaining of headache heart palpitations and lightheadedness.  In regards to her headache differential includes migraine, tension headache, sinus headache, meningitis, subarachnoid hemorrhage, or other causes.  Intermittent headache mild to moderate and no meningeal signs no nausea vomiting would be unlikely represent concerning cause.  Therefore not believe advanced imaging or lumbar puncture are indicated.  She get relief after interventions.  In regards to her near syncopal symptoms or heart palpitations EKG is reassuring reassuring here as well as laboratory work.  Suspect this is vasovagal in nature and would doubt arrhythmia or concerning causes.  With the presented she otherwise appropriate for outpatient management told return for any worsening symptoms or concerns.    Diagnosis:    ICD-10-CM    1. Nonintractable headache, unspecified chronicity pattern, unspecified headache type  R51.9       2. Palpitations  R00.2            Discharge Medications:  New Prescriptions    No medications on file          Scribe Disclosure:  John PATEL, am serving as a scribe at 12:24 AM on 7/10/2023 to document services personally performed by Pedro Harris MD based on my observations and the provider's     Pedro Harris MD  07/10/23 1710

## 2023-07-10 NOTE — DISCHARGE INSTRUCTIONS
Discharge Instructions  Headache    You were seen today for a headache. Headaches may be caused by many different things such as muscle tension, sinus inflammation, anxiety and stress, having too little sleep, too much alcohol, some medical conditions or injury. You may have a migraine, which is caused by changes in the blood vessels in your head.  At this time your provider does not find that your headache is a sign of anything dangerous or life-threatening.  However, sometimes the signs of serious illness do not show up right away.      Generally, every Emergency Department visit should have a follow-up clinic visit with either a primary or a specialty clinic/provider. Please follow-up as instructed by your emergency provider today.    Return to the Emergency Department if:  You get a new fever of 100.4 F or higher.  Your headache gets much worse.  You get a stiff neck with your headache.  You get a new headache that is significantly different or worse than headaches you have had before.  You are vomiting (throwing up) and cannot keep food or water down.  You have blurry or double vision or other problems with your eyes.  You have a new weakness on one side of your body.  You have difficulty with balance which is new.  You or your family thinks you are confused.  You have a seizure.    What can I do to help myself?  Pain medications - You may take a pain medication such as Tylenol  (acetaminophen), Advil , Motrin  (ibuprofen) or Aleve  (naproxen).  Take a pain reliever as soon as you notice symptoms.  Starting medications as soon as you start to have symptoms may lessen the amount of pain you have.  Relaxing in a quiet, dark room may help.  Get enough sleep and eat meals regularly.  You may need to watch for certain foods or other things which may trigger your headaches.  Keeping a journal of your headaches and possible triggers may help you and your primary provider to identify things which you should avoid which  may be causing your headaches.  If you were given a prescription for medicine here today, be sure to read all of the information (including the package insert) that comes with your prescription.  This will include important information about the medicine, its side effects, and any warnings that you need to know about.  The pharmacist who fills the prescription can provide more information and answer questions you may have about the medicine.  If you have questions or concerns that the pharmacist cannot address, please call or return to the Emergency Department.   Remember that you can always come back to the Emergency Department if you are not able to see your regular provider in the amount of time listed above, if you get any new symptoms, or if there is anything that worries you.  Discharge Instructions  Palpitations    Palpitations are an unusual awareness of your heartbeat. People often describe this as the heart skipping, fluttering, racing, irregular, or pounding. At this time, your provider has found no signs that your palpitations are due to a serious or life-threatening condition. However, sometimes there is a serious problem that does not show up right away.    Palpitations can be caused by caffeine, cigarettes, diet pills, energy drinks or supplements, other stimulants, and medications and street drugs. They can also be caused by anxiety, hormone conditions such as high thyroid, and other medical conditions. Sometimes they are a sign of abnormal rhythm in the heart. At this time, your provider did not find any dangerous cause of your symptoms.    Generally, every Emergency Department visit should have a follow-up clinic visit with either a primary or a specialty clinic/provider. Please follow-up as instructed by your emergency provider today.    Return to the Emergency Department if:  You get chest pain or tightness.  You are short of breath.  You get very weak or tired.  You pass out or faint.  Your  heart rate is over 120 beats per minute for more than 10 minutes while you are resting.   You have anything else that worries you.    What can I do to help myself?  Fill any prescriptions the provider gave you and take them right away.   Follow your provider s instructions about the prescription medicines you are on. Sometimes the provider may tell you to stop taking a medicine or change the dose.  If you smoke, this may be a good time to quit! The less you can smoke, the better.  Do not use energy drinks, diet pills, or stimulants. Limit your use of caffeine.  If you were given a prescription for medicine here today, be sure to read all of the information (including the package insert) that comes with your prescription.  This will include important information about the medicine, its side effects, and any warnings that you need to know about.  The pharmacist who fills the prescription can provide more information and answer questions you may have about the medicine.  If you have questions or concerns that the pharmacist cannot address, please call or return to the Emergency Department.     Remember that you can always come back to the Emergency Department if you are not able to see your regular provider in the amount of time listed above, if you get any new symptoms, or if there is anything that worries you.

## 2023-07-10 NOTE — ED TRIAGE NOTES
Pt presents for complaint of intermittent headaches for the past 3 days. States today she developed chest pain, palpitations, shortness of breath. States she has a hx of palpitations but not usually this severe. Denies hx of headaches. ABC intact, A&Ox4.     Triage Assessment     Row Name 07/09/23 1952       Triage Assessment (Adult)    Airway WDL WDL       Respiratory WDL    Respiratory WDL WDL       Skin Circulation/Temperature WDL    Skin Circulation/Temperature WDL WDL       Cardiac WDL    Cardiac WDL WDL       Peripheral/Neurovascular WDL    Peripheral Neurovascular WDL WDL       Cognitive/Neuro/Behavioral WDL    Cognitive/Neuro/Behavioral WDL WDL

## 2024-05-25 ENCOUNTER — HOSPITAL ENCOUNTER (EMERGENCY)
Facility: CLINIC | Age: 24
Discharge: HOME | End: 2024-05-25
Attending: EMERGENCY MEDICINE | Admitting: EMERGENCY MEDICINE
Payer: COMMERCIAL

## 2024-05-25 VITALS
HEART RATE: 88 BPM | DIASTOLIC BLOOD PRESSURE: 73 MMHG | SYSTOLIC BLOOD PRESSURE: 142 MMHG | WEIGHT: 259.48 LBS | RESPIRATION RATE: 20 BRPM | TEMPERATURE: 97.7 F | BODY MASS INDEX: 40.73 KG/M2 | HEIGHT: 67 IN | OXYGEN SATURATION: 97 %

## 2024-05-25 DIAGNOSIS — J40 BRONCHITIS: ICD-10-CM

## 2024-05-25 DIAGNOSIS — J98.01 BRONCHOSPASM: ICD-10-CM

## 2024-05-25 LAB
FLUAV RNA SPEC QL NAA+PROBE: NEGATIVE
FLUBV RNA RESP QL NAA+PROBE: NEGATIVE
GROUP A STREP BY PCR: NOT DETECTED
RSV RNA SPEC NAA+PROBE: NEGATIVE
SARS-COV-2 RNA RESP QL NAA+PROBE: NEGATIVE

## 2024-05-25 PROCEDURE — 250N000012 HC RX MED GY IP 250 OP 636 PS 637: Performed by: EMERGENCY MEDICINE

## 2024-05-25 PROCEDURE — 250N000009 HC RX 250: Performed by: EMERGENCY MEDICINE

## 2024-05-25 PROCEDURE — 87637 SARSCOV2&INF A&B&RSV AMP PRB: CPT | Performed by: EMERGENCY MEDICINE

## 2024-05-25 PROCEDURE — 87651 STREP A DNA AMP PROBE: CPT | Performed by: EMERGENCY MEDICINE

## 2024-05-25 PROCEDURE — 99285 EMERGENCY DEPT VISIT HI MDM: CPT | Mod: 25

## 2024-05-25 PROCEDURE — 94640 AIRWAY INHALATION TREATMENT: CPT

## 2024-05-25 PROCEDURE — 93005 ELECTROCARDIOGRAM TRACING: CPT

## 2024-05-25 RX ORDER — IPRATROPIUM BROMIDE AND ALBUTEROL SULFATE 2.5; .5 MG/3ML; MG/3ML
3 SOLUTION RESPIRATORY (INHALATION)
Qty: 9 ML | Refills: 0 | Status: COMPLETED | OUTPATIENT
Start: 2024-05-25 | End: 2024-05-25

## 2024-05-25 RX ORDER — PREDNISONE 20 MG/1
TABLET ORAL
Qty: 10 TABLET | Refills: 0 | Status: SHIPPED | OUTPATIENT
Start: 2024-05-25 | End: 2024-05-25

## 2024-05-25 RX ORDER — ALBUTEROL SULFATE 90 UG/1
2 AEROSOL, METERED RESPIRATORY (INHALATION) EVERY 6 HOURS PRN
Qty: 18 G | Refills: 0 | Status: SHIPPED | OUTPATIENT
Start: 2024-05-25

## 2024-05-25 RX ORDER — PREDNISONE 20 MG/1
TABLET ORAL
Qty: 10 TABLET | Refills: 0 | Status: SHIPPED | OUTPATIENT
Start: 2024-05-25

## 2024-05-25 RX ORDER — PREDNISONE 20 MG/1
20 TABLET ORAL ONCE
Status: COMPLETED | OUTPATIENT
Start: 2024-05-25 | End: 2024-05-25

## 2024-05-25 RX ORDER — ALBUTEROL SULFATE 90 UG/1
2 AEROSOL, METERED RESPIRATORY (INHALATION) EVERY 6 HOURS PRN
Qty: 18 G | Refills: 0 | Status: SHIPPED | OUTPATIENT
Start: 2024-05-25 | End: 2024-05-25

## 2024-05-25 RX ADMIN — PREDNISONE 20 MG: 20 TABLET ORAL at 20:44

## 2024-05-25 RX ADMIN — IPRATROPIUM BROMIDE AND ALBUTEROL SULFATE 3 ML: .5; 3 SOLUTION RESPIRATORY (INHALATION) at 21:10

## 2024-05-25 RX ADMIN — IPRATROPIUM BROMIDE AND ALBUTEROL SULFATE 3 ML: .5; 3 SOLUTION RESPIRATORY (INHALATION) at 20:41

## 2024-05-25 RX ADMIN — IPRATROPIUM BROMIDE AND ALBUTEROL SULFATE 3 ML: .5; 3 SOLUTION RESPIRATORY (INHALATION) at 20:56

## 2024-05-25 ASSESSMENT — ACTIVITIES OF DAILY LIVING (ADL)
ADLS_ACUITY_SCORE: 35
ADLS_ACUITY_SCORE: 35

## 2024-05-25 ASSESSMENT — COLUMBIA-SUICIDE SEVERITY RATING SCALE - C-SSRS
2. HAVE YOU ACTUALLY HAD ANY THOUGHTS OF KILLING YOURSELF IN THE PAST MONTH?: NO
1. IN THE PAST MONTH, HAVE YOU WISHED YOU WERE DEAD OR WISHED YOU COULD GO TO SLEEP AND NOT WAKE UP?: NO
6. HAVE YOU EVER DONE ANYTHING, STARTED TO DO ANYTHING, OR PREPARED TO DO ANYTHING TO END YOUR LIFE?: NO

## 2024-05-25 NOTE — Clinical Note
Cesia Johnson was seen and treated in our emergency department on 5/25/2024.  She may return to work on 05/28/2024.       If you have any questions or concerns, please don't hesitate to call.      Fabián Carrizales MD

## 2024-05-26 NOTE — ED TRIAGE NOTES
Cold symptoms and cough that started 5 days ago. Patient reports worsening chest tight and shortness of breath.

## 2024-05-26 NOTE — ED PROVIDER NOTES
"    History     Chief Complaint:  Shortness of Breath and Cough       HPI   Cesia Johnson is a 23 year old female who reports that she has had bronchitis in the past but is staying at a place where there is someone who has strep the patient has had previous surgery for her tongue and her adenoids and deviated septum.  The patient reports in the last several days she started to have a cough that is productive chest tightness with shortness of breath said she noticed of wheezing and presented to the ER due to concern for bronchitis.  The patient said her period is late but she has had a negative pregnancy test at home.        Review of External Notes:  4/3/2024 note reviewed    Medications:    albuterol (PROAIR HFA/PROVENTIL HFA/VENTOLIN HFA) 108 (90 Base) MCG/ACT inhaler  predniSONE (DELTASONE) 20 MG tablet  cyclobenzaprine (FLEXERIL) 10 MG tablet  diclofenac (VOLTAREN) 1 % topical gel  diltiazem ER (DILT-XR) 180 MG 24 hr capsule  DULoxetine (CYMBALTA) 60 MG capsule  gabapentin (NEURONTIN) 300 MG capsule  LORazepam (ATIVAN) 1 MG tablet  methylPREDNISolone (MEDROL DOSEPAK) 4 MG tablet therapy pack  Multiple Vitamin (MULTIVITAMIN PO)  traZODone (DESYREL) 50 MG tablet        Past Medical History:    Past Medical History:   Diagnosis Date    ADHD (attention deficit hyperactivity disorder)     Anxiety     Depression        Past Surgical History:    Past Surgical History:   Procedure Laterality Date    TONSILLECTOMY            Physical Exam   Patient Vitals for the past 24 hrs:   BP Temp Temp src Pulse Resp SpO2 Height Weight   05/25/24 2208 (!) 142/73 -- -- 88 -- 97 % -- --   05/25/24 2200 138/82 -- -- 100 -- 97 % -- --   05/25/24 2100 124/72 -- -- 84 -- 100 % -- --   05/25/24 2015 (!) 152/97 97.7  F (36.5  C) Temporal 97 20 98 % 1.702 m (5' 7\") 117.7 kg (259 lb 7.7 oz)        Physical Exam  General:  No respiratory distress    HEENT: The throat is red    Cardiovascular: Good cap refill.    Respiratory: The patient is " speaking in full sentences to ask location there is some end expiratory wheezing    Musculoskeletal: No tenderness. No bony deformity.     Skin: No rashes or petechiae     Neurologic: non focal      Psychiatric: Appropriate      Emergency Department Course   ECG  ECG taken at 2028, ECG read at 2032  Normal sinus rhythm    Rate 87 bpm. MS interval 164 ms. QRS duration 74 ms. QT/QTc 348/418 ms. P-R-T axes 14 4021.    Imaging:  No orders to display       Laboratory:  Labs Ordered and Resulted from Time of ED Arrival to Time of ED Departure   INFLUENZA A/B, RSV, & SARS-COV2 PCR - Normal       Result Value    Influenza A PCR Negative      Influenza B PCR Negative      RSV PCR Negative      SARS CoV2 PCR Negative     GROUP A STREPTOCOCCUS PCR THROAT SWAB - Normal    Group A strep by PCR Not Detected          Procedures       Emergency Department Course & Assessments:    Interventions:  Medications   ipratropium - albuterol 0.5 mg/2.5 mg/3 mL (DUONEB) neb solution 3 mL (3 mLs Nebulization $Given 5/25/24 2110)   predniSONE (DELTASONE) tablet 20 mg (20 mg Oral $Given 5/25/24 2044)        Assessments:  I reassessed the patient and she feels improved and has decreased wheezing.             Disposition:  The patient was discharged.    Impression & Plan         Medical Decision Making:  The patient has not been diagnosed with asthma but has wheezing which I think is likely secondary to a viral URI.  We did discuss performing a chest x-ray however the patient is late for her.  And due to the potential for pregnancy that is early on we will hold on a chest x-ray.  I think there is it is unlikely there is pneumonia.  The patient does have a red throat but has had recent surgery a strep and COVID test were ordered and neb was ordered.        Diagnosis:    ICD-10-CM    1. Bronchitis  J40       2. Bronchospasm  J98.01            Discharge Medications:  Discharge Medication List as of 5/25/2024 10:00 PM               5/25/2024    Fabián Carrizales MD Farnan, Christopher M, MD  05/26/24 7689

## 2024-05-28 LAB
ATRIAL RATE - MUSE: 87 BPM
DIASTOLIC BLOOD PRESSURE - MUSE: NORMAL MMHG
INTERPRETATION ECG - MUSE: NORMAL
P AXIS - MUSE: 14 DEGREES
PR INTERVAL - MUSE: 164 MS
QRS DURATION - MUSE: 74 MS
QT - MUSE: 348 MS
QTC - MUSE: 418 MS
R AXIS - MUSE: 40 DEGREES
SYSTOLIC BLOOD PRESSURE - MUSE: NORMAL MMHG
T AXIS - MUSE: 21 DEGREES
VENTRICULAR RATE- MUSE: 87 BPM

## 2024-07-18 ENCOUNTER — HOSPITAL ENCOUNTER (EMERGENCY)
Facility: CLINIC | Age: 24
Discharge: HOME | End: 2024-07-18
Attending: STUDENT IN AN ORGANIZED HEALTH CARE EDUCATION/TRAINING PROGRAM | Admitting: STUDENT IN AN ORGANIZED HEALTH CARE EDUCATION/TRAINING PROGRAM
Payer: COMMERCIAL

## 2024-07-18 ENCOUNTER — APPOINTMENT (OUTPATIENT)
Dept: GENERAL RADIOLOGY | Facility: CLINIC | Age: 24
End: 2024-07-18
Attending: STUDENT IN AN ORGANIZED HEALTH CARE EDUCATION/TRAINING PROGRAM
Payer: COMMERCIAL

## 2024-07-18 VITALS
DIASTOLIC BLOOD PRESSURE: 87 MMHG | SYSTOLIC BLOOD PRESSURE: 130 MMHG | WEIGHT: 264.55 LBS | BODY MASS INDEX: 41.52 KG/M2 | HEART RATE: 91 BPM | TEMPERATURE: 98.5 F | RESPIRATION RATE: 16 BRPM | HEIGHT: 67 IN | OXYGEN SATURATION: 100 %

## 2024-07-18 DIAGNOSIS — R00.2 PALPITATIONS: Primary | ICD-10-CM

## 2024-07-18 DIAGNOSIS — Z87.898 HISTORY OF PALPITATIONS: ICD-10-CM

## 2024-07-18 DIAGNOSIS — R06.02 SHORTNESS OF BREATH: ICD-10-CM

## 2024-07-18 PROBLEM — Z72.0 TOBACCO USER: Status: ACTIVE | Noted: 2017-10-04

## 2024-07-18 PROBLEM — F12.90 MARIJUANA USE: Status: ACTIVE | Noted: 2017-10-04

## 2024-07-18 PROBLEM — Z72.89 SELF-MUTILATION: Status: ACTIVE | Noted: 2020-05-25

## 2024-07-18 PROBLEM — E78.6 LOW HDL (UNDER 40): Status: ACTIVE | Noted: 2017-12-06

## 2024-07-18 PROBLEM — Z30.09 BIRTH CONTROL COUNSELING: Status: ACTIVE | Noted: 2017-10-04

## 2024-07-18 PROBLEM — F41.9 ANXIETY AND DEPRESSION: Status: ACTIVE | Noted: 2017-10-04

## 2024-07-18 PROBLEM — F32.A ANXIETY AND DEPRESSION: Status: ACTIVE | Noted: 2017-10-04

## 2024-07-18 PROBLEM — F12.90 MISUSE OF CANNABIS: Status: ACTIVE | Noted: 2017-10-04

## 2024-07-18 LAB
ANION GAP SERPL CALCULATED.3IONS-SCNC: 13 MMOL/L (ref 7–15)
ATRIAL RATE - MUSE: 93 BPM
BASOPHILS # BLD MANUAL: 0 10E3/UL (ref 0–0.2)
BASOPHILS NFR BLD MANUAL: 0 %
BUN SERPL-MCNC: 9.5 MG/DL (ref 6–20)
CALCIUM SERPL-MCNC: 9.1 MG/DL (ref 8.8–10.4)
CHLORIDE SERPL-SCNC: 103 MMOL/L (ref 98–107)
CREAT SERPL-MCNC: 0.68 MG/DL (ref 0.51–0.95)
DIASTOLIC BLOOD PRESSURE - MUSE: NORMAL MMHG
EGFRCR SERPLBLD CKD-EPI 2021: >90 ML/MIN/1.73M2
EOSINOPHIL # BLD MANUAL: 0.3 10E3/UL (ref 0–0.7)
EOSINOPHIL NFR BLD MANUAL: 2 %
ERYTHROCYTE [DISTWIDTH] IN BLOOD BY AUTOMATED COUNT: 12.7 % (ref 10–15)
FLUAV RNA SPEC QL NAA+PROBE: NEGATIVE
FLUBV RNA RESP QL NAA+PROBE: NEGATIVE
GLUCOSE SERPL-MCNC: 84 MG/DL (ref 70–99)
HCO3 SERPL-SCNC: 22 MMOL/L (ref 22–29)
HCT VFR BLD AUTO: 43.3 % (ref 35–47)
HGB BLD-MCNC: 14.5 G/DL (ref 11.7–15.7)
HOLD SPECIMEN: NORMAL
HOLD SPECIMEN: NORMAL
INTERPRETATION ECG - MUSE: NORMAL
LYMPHOCYTES # BLD MANUAL: 5 10E3/UL (ref 0.8–5.3)
LYMPHOCYTES NFR BLD MANUAL: 34 %
MCH RBC QN AUTO: 30.1 PG (ref 26.5–33)
MCHC RBC AUTO-ENTMCNC: 33.5 G/DL (ref 31.5–36.5)
MCV RBC AUTO: 90 FL (ref 78–100)
MONOCYTES # BLD MANUAL: 1.2 10E3/UL (ref 0–1.3)
MONOCYTES NFR BLD MANUAL: 8 %
MYELOCYTES # BLD MANUAL: 0.1 10E3/UL
MYELOCYTES NFR BLD MANUAL: 1 %
NEUTROPHILS # BLD MANUAL: 8.1 10E3/UL (ref 1.6–8.3)
NEUTROPHILS NFR BLD MANUAL: 55 %
NRBC # BLD AUTO: 0 10E3/UL
NRBC BLD AUTO-RTO: 0 /100
P AXIS - MUSE: 31 DEGREES
PLAT MORPH BLD: ABNORMAL
PLAT MORPH BLD: NORMAL
PLATELET # BLD AUTO: 341 10E3/UL (ref 150–450)
POTASSIUM SERPL-SCNC: 3.6 MMOL/L (ref 3.4–5.3)
PR INTERVAL - MUSE: 160 MS
QRS DURATION - MUSE: 80 MS
QT - MUSE: 356 MS
QTC - MUSE: 442 MS
R AXIS - MUSE: 46 DEGREES
RBC # BLD AUTO: 4.81 10E6/UL (ref 3.8–5.2)
RBC MORPH BLD: ABNORMAL
RBC MORPH BLD: NORMAL
RSV RNA SPEC NAA+PROBE: NEGATIVE
SARS-COV-2 RNA RESP QL NAA+PROBE: NEGATIVE
SODIUM SERPL-SCNC: 138 MMOL/L (ref 135–145)
SYSTOLIC BLOOD PRESSURE - MUSE: NORMAL MMHG
T AXIS - MUSE: 36 DEGREES
TROPONIN T SERPL HS-MCNC: <6 NG/L
VENTRICULAR RATE- MUSE: 93 BPM
WBC # BLD AUTO: 14.7 10E3/UL (ref 4–11)

## 2024-07-18 PROCEDURE — 87637 SARSCOV2&INF A&B&RSV AMP PRB: CPT | Performed by: STUDENT IN AN ORGANIZED HEALTH CARE EDUCATION/TRAINING PROGRAM

## 2024-07-18 PROCEDURE — 82374 ASSAY BLOOD CARBON DIOXIDE: CPT | Performed by: EMERGENCY MEDICINE

## 2024-07-18 PROCEDURE — 84484 ASSAY OF TROPONIN QUANT: CPT | Performed by: STUDENT IN AN ORGANIZED HEALTH CARE EDUCATION/TRAINING PROGRAM

## 2024-07-18 PROCEDURE — 80048 BASIC METABOLIC PNL TOTAL CA: CPT | Performed by: STUDENT IN AN ORGANIZED HEALTH CARE EDUCATION/TRAINING PROGRAM

## 2024-07-18 PROCEDURE — 85027 COMPLETE CBC AUTOMATED: CPT | Performed by: EMERGENCY MEDICINE

## 2024-07-18 PROCEDURE — 36415 COLL VENOUS BLD VENIPUNCTURE: CPT | Performed by: EMERGENCY MEDICINE

## 2024-07-18 PROCEDURE — 85027 COMPLETE CBC AUTOMATED: CPT | Performed by: STUDENT IN AN ORGANIZED HEALTH CARE EDUCATION/TRAINING PROGRAM

## 2024-07-18 PROCEDURE — 84484 ASSAY OF TROPONIN QUANT: CPT | Performed by: EMERGENCY MEDICINE

## 2024-07-18 PROCEDURE — 85007 BL SMEAR W/DIFF WBC COUNT: CPT | Performed by: STUDENT IN AN ORGANIZED HEALTH CARE EDUCATION/TRAINING PROGRAM

## 2024-07-18 PROCEDURE — 71046 X-RAY EXAM CHEST 2 VIEWS: CPT

## 2024-07-18 PROCEDURE — 93005 ELECTROCARDIOGRAM TRACING: CPT

## 2024-07-18 PROCEDURE — 99285 EMERGENCY DEPT VISIT HI MDM: CPT | Mod: 25

## 2024-07-18 ASSESSMENT — ACTIVITIES OF DAILY LIVING (ADL)
ADLS_ACUITY_SCORE: 35
ADLS_ACUITY_SCORE: 35

## 2024-07-18 NOTE — ED TRIAGE NOTES
Pt states that about an hour PTA she was cleaning out her apartment and developed sudden tachycardia and SOB.  Pt states that her heartrate was in the 130s PTA     Triage Assessment (Adult)       Row Name 07/18/24 0915          Triage Assessment    Airway WDL WDL        Respiratory WDL    Respiratory WDL X;all     Rhythm/Pattern, Respiratory shortness of breath        Skin Circulation/Temperature WDL    Skin Circulation/Temperature WDL WDL        Cardiac WDL    Cardiac WDL X     Cardiac Rhythm ST        Peripheral/Neurovascular WDL    Peripheral Neurovascular WDL WDL        Cognitive/Neuro/Behavioral WDL    Cognitive/Neuro/Behavioral WDL WDL

## 2024-07-18 NOTE — DISCHARGE INSTRUCTIONS
Thank you for allowing us to evaluate you today.  Follow up with primary care clinician  as scheduled for reevaluation..  Immediately return to the emergency department with any concerns.

## 2024-07-18 NOTE — Clinical Note
Cesia Johnson was seen and treated in our emergency department on 7/18/2024.  She may return to work on 07/21/2024.       If you have any questions or concerns, please don't hesitate to call.      Ameya Briceno MD

## 2024-07-18 NOTE — ED PROVIDER NOTES
Emergency Department Note      History of Present Illness     Chief Complaint   Shortness of Breath    HPI   Cesia Johnson is a very pleasant 23 year old female with history of pulmonary artery stenosis, history of palpitations, presenting with shortness of breath. Today, the patient was doing chores around the house, while preparing to move out and dealing with mold, when she noticed her heart rate was at 135 bpm on her smart watch and she was feeling short of breath. On May 25th of this year, the patient had bronchitis, and has been feeling short of breath since. At 0809, her heart rate was jumping from a low rate to a high rate. At the time of symptom onset, Cesia was placing objects in bags. She has been monitoring her high heart beat for awhile because walking up stairs and long distances has caused increased shortness of breath. She has been unable to work at her job that requires standing for long hours and heavy lifting. She uses an inhaler x2 a day, her last use being at 0700, and just found out she is allergic to her 6 cats. She hasn't been able to sleep well and feels a tightness in her chest. The patient endorses a mostly dry cough, wheezing, and fatigue. She notes that it feels hard to breath, but is overall breathing well. Denies drug, alcohol, or nicotine use in the past 4 months. The patient has a history of heart problems, as she was born with pulmonary stenosis and has had palpitations since she was 18 years old. She also has a history of stimulant use, but has been sober since 2019. She has an appointment with her pulmonologist to be evaluated for asthma. Of note, Cesia recently wore a Ziopatch for 10 days, during which she experienced similar cardiac symptoms.    Independent Historian   None    Review of External Notes   I personally reviewed notes from the patient's emergency department visit  dated  07/12/24 . This provided me with information regarding patient's recent clinical course.  "    I personally reviewed the patient's chart, including available medication list and available past medical history, past surgical history, family history, and social history.    Physical Exam     Patient Vitals for the past 24 hrs:   BP Temp Temp src Pulse Resp SpO2 Height Weight   07/18/24 1233 130/87 -- -- -- 16 100 % -- --   07/18/24 1045 (!) 133/90 -- -- 91 -- 99 % -- --   07/18/24 1030 138/85 -- -- 97 -- 98 % -- --   07/18/24 1015 (!) 141/84 -- -- 89 -- 99 % -- --   07/18/24 0918 130/87 98.5  F (36.9  C) Temporal 105 24 100 % 1.702 m (5' 7\") 120 kg (264 lb 8.8 oz)      Physical Exam   General: Alert, pleasant, young female appearing stated age  HENT: Moist mucous membranes  Eyes: No scleral icterus, no pallor  Neck: No jugular venous distension  Cardiovascular: Regular rate and rhythm, S1, fixed split S2, no murmurs, gallops or rubs. 2+ radial pulses bilaterally.   Pulmonary: Easy work of breathing, lungs clear to auscultation bilaterally, no rales, rhonchi, wheeze  Skin: Warm and dry, no pallor, no rash, numerous tattoos    Diagnostics     Lab Results   Labs Ordered and Resulted from Time of ED Arrival to Time of ED Departure   CBC WITH PLATELETS AND DIFFERENTIAL - Abnormal       Result Value    WBC Count 14.7 (*)     RBC Count 4.81      Hemoglobin 14.5      Hematocrit 43.3      MCV 90      MCH 30.1      MCHC 33.5      RDW 12.7      Platelet Count 341      NRBCs per 100 WBC 0      Absolute NRBCs 0.0     MANUAL DIFFERENTIAL - Abnormal    % Neutrophils 55      % Lymphocytes 34      % Monocytes 8      % Eosinophils 2      % Basophils 0      % Myelocytes 1      Absolute Neutrophils 8.1      Absolute Lymphocytes 5.0      Absolute Monocytes 1.2      Absolute Eosinophils 0.3      Absolute Basophils 0.0      Absolute Myelocytes 0.1 (*)     RBC Morphology Confirmed RBC Indices      Platelet Assessment        Value: Automated Count Confirmed. Platelet morphology is normal.   BASIC METABOLIC PANEL - Normal    Sodium " 138      Potassium 3.6      Chloride 103      Carbon Dioxide (CO2) 22      Anion Gap 13      Urea Nitrogen 9.5      Creatinine 0.68      GFR Estimate >90      Calcium 9.1      Glucose 84     TROPONIN T, HIGH SENSITIVITY - Normal    Troponin T, High Sensitivity <6     INFLUENZA A/B, RSV, & SARS-COV2 PCR - Normal    Influenza A PCR Negative      Influenza B PCR Negative      RSV PCR Negative      SARS CoV2 PCR Negative     RBC AND PLATELET MORPHOLOGY    RBC Morphology Confirmed RBC Indices      Platelet Assessment        Value: Automated Count Confirmed. Platelet morphology is normal.       Imaging   XR Chest 2 Views   Final Result   IMPRESSION: Negative.      TOO COTTON MD            SYSTEM ID:  [id number]          EKG  ECG taken at 0923, ECG read at 0925  Sinus rhythm  Normal ECG  Rate 93 bpm. CT interval 160 ms. QRS duration 80 ms. QT/QTc 356/442 ms. P-R-T axes 31 46 36.     Independent Interpretation   See ED Course below    ED Course      Medications Administered   Medications - No data to display    Procedures   Procedures   None performed    Discussion of Management   See ED Course below    Social Determinants of Health adding to complexity of care   None.      ED Course   Independent Interpretation / Discussion of Management / Repeat Assessments  ED Course as of 07/18/24 1333   Thu Jul 18, 2024   1044 I obtained the history and examined the patient as above.    1056 I rechecked and updated the patient.     1227 XR Chest 2 Views  I independently interpreted the patient's chest x-ray; reassuring against infiltrate.     1229 I rechecked and updated the patient.         Medical Decision Making / Diagnosis     CMS Diagnoses: None    MIPS       None    MDM   Patient with history of palpitations presenting with palpitations and shortness of breath.  On arrival, patient's vital signs are notable for tachycardia but otherwise unremarkable.  Heart rate normalized while in the emergency department.  I reviewed the  patient's recent emergency department visit earlier this month with similar symptoms.  At that time, 7/5/2024, testing included TSH within normal limits, negative pregnancy test, BNP, magnesium within normal limits.  Patient recently completed a 10-day Zio monitor and has echocardiogram ordered.  She follows with [facility] and last saw them 6 days ago.  Suspect patient's symptoms today secondary to her ongoing issues with cardiac arrhythmia/palpitations.  Did consider broader differential including acute coronary syndrome/myocarditis, COVID, pneumonia, among others.  Workup here was notable for a mild leukocytosis but no evidence of infiltrate on x-ray and negative COVID testing.  Otherwise, workup was reassuring.  EKG was sinus rhythm with no evidence of arrhythmia or acute appearing ischemic changes.  Troponin was undetectable.  I did consider pulmonary embolism but given similarity to previous episodes, no persistent symptoms, no persistent tachycardia, hypoxemia or tachypnea, felt this was unlikely.  I feel the patient is appropriate for discharge with follow-up with cardiology for results of cardiac monitoring and echocardiogram.  Patient is comfortable with this plan. Findings were discussed.  Additional verbal instructions were provided.  I discussed specific warning signs and instructed the patient to return to the emergency department if there are any concerns. Understanding of instructions was voiced, questions were answered and the patient was discharged.          Disposition   The patient was discharged.     Diagnosis     ICD-10-CM    1. Palpitations  R00.2       2. Shortness of breath  R06.02       3. History of palpitations  Z87.898            Discharge Medications   Discharge Medication List as of 7/18/2024 12:37 PM        Scribe Disclosure:  I, Phoenix Peterson, am serving as a scribe at 1:30 PM on 7/18/2024 to document services personally performed by Ameya Briceno MD based on my  observations and the provider's statements to me.    Scribe Disclosure:  I, Jazz Davin, am serving as the scribe  for Phoenix Peterson, the scribe trainee, at 1:39 PM on 7/18/2024 to document services personally performed by Ameya Briceno MD based on our observations and the provider's statements to us.       Ameya Briceno MD  07/18/24 0318

## 2024-12-29 ENCOUNTER — HEALTH MAINTENANCE LETTER (OUTPATIENT)
Age: 24
End: 2024-12-29